# Patient Record
Sex: FEMALE | Race: WHITE | NOT HISPANIC OR LATINO | Employment: FULL TIME | URBAN - METROPOLITAN AREA
[De-identification: names, ages, dates, MRNs, and addresses within clinical notes are randomized per-mention and may not be internally consistent; named-entity substitution may affect disease eponyms.]

---

## 2023-04-11 ENCOUNTER — OFFICE VISIT (OUTPATIENT)
Dept: PRIMARY CARE | Facility: CLINIC | Age: 58
End: 2023-04-11
Payer: COMMERCIAL

## 2023-04-11 VITALS
HEIGHT: 64 IN | WEIGHT: 140 LBS | SYSTOLIC BLOOD PRESSURE: 100 MMHG | OXYGEN SATURATION: 97 % | BODY MASS INDEX: 23.9 KG/M2 | TEMPERATURE: 97.7 F | DIASTOLIC BLOOD PRESSURE: 60 MMHG | RESPIRATION RATE: 14 BRPM | HEART RATE: 72 BPM

## 2023-04-11 DIAGNOSIS — N63.21 MASS OF UPPER OUTER QUADRANT OF LEFT BREAST: Primary | ICD-10-CM

## 2023-04-11 PROBLEM — S16.1XXA NECK STRAIN, INITIAL ENCOUNTER: Status: ACTIVE | Noted: 2023-04-11

## 2023-04-11 PROBLEM — L65.9 HAIR LOSS: Status: ACTIVE | Noted: 2023-04-11

## 2023-04-11 PROBLEM — N60.81 FLAT EPITHELIAL ATYPIA OF RIGHT BREAST: Status: ACTIVE | Noted: 2023-04-11

## 2023-04-11 PROBLEM — R92.2 DENSE BREASTS: Status: ACTIVE | Noted: 2023-04-11

## 2023-04-11 PROBLEM — R92.8 ABNORMAL MRI, BREAST: Status: ACTIVE | Noted: 2023-04-11

## 2023-04-11 PROBLEM — S76.011S: Status: ACTIVE | Noted: 2023-04-11

## 2023-04-11 PROBLEM — R92.30 DENSE BREASTS: Status: ACTIVE | Noted: 2023-04-11

## 2023-04-11 PROBLEM — K63.3 EROSION OF TERMINAL ILEUM: Status: ACTIVE | Noted: 2023-04-11

## 2023-04-11 PROBLEM — F41.9 ANXIETY DISORDER: Status: ACTIVE | Noted: 2023-04-11

## 2023-04-11 PROBLEM — M54.16 LUMBAR RADICULOPATHY: Status: ACTIVE | Noted: 2023-04-11

## 2023-04-11 PROBLEM — N95.2 VAGINAL ATROPHY: Status: ACTIVE | Noted: 2023-04-11

## 2023-04-11 PROBLEM — H02.89 STEATOBLEPHARON: Status: ACTIVE | Noted: 2023-04-11

## 2023-04-11 PROBLEM — E78.5 HLD (HYPERLIPIDEMIA): Status: ACTIVE | Noted: 2023-04-11

## 2023-04-11 PROBLEM — K58.1 IRRITABLE BOWEL SYNDROME WITH PREDOMINANT CONSTIPATION: Status: ACTIVE | Noted: 2023-04-11

## 2023-04-11 PROBLEM — H02.831 DERMATOCHALASIS OF RIGHT UPPER EYELID: Status: ACTIVE | Noted: 2023-04-11

## 2023-04-11 PROBLEM — E03.9 HYPOTHYROID: Status: ACTIVE | Noted: 2023-04-11

## 2023-04-11 PROBLEM — S46.911S SHOULDER STRAIN, RIGHT, SEQUELA: Status: ACTIVE | Noted: 2023-04-11

## 2023-04-11 PROBLEM — M25.551 HIP PAIN, RIGHT: Status: ACTIVE | Noted: 2023-04-11

## 2023-04-11 PROBLEM — M79.9 POSTURAL STRAIN: Status: ACTIVE | Noted: 2023-04-11

## 2023-04-11 PROBLEM — N63.20 LEFT BREAST MASS: Status: ACTIVE | Noted: 2023-04-11

## 2023-04-11 PROBLEM — M53.3 SACRAL BACK PAIN: Status: ACTIVE | Noted: 2023-04-11

## 2023-04-11 PROBLEM — H81.10 BPPV (BENIGN PAROXYSMAL POSITIONAL VERTIGO): Status: ACTIVE | Noted: 2023-04-11

## 2023-04-11 PROBLEM — M99.09 SEGMENTAL AND SOMATIC DYSFUNCTION: Status: ACTIVE | Noted: 2023-04-11

## 2023-04-11 PROBLEM — E55.9 VITAMIN D DEFICIENCY: Status: ACTIVE | Noted: 2023-04-11

## 2023-04-11 PROBLEM — K59.00 CONSTIPATION: Status: ACTIVE | Noted: 2023-04-11

## 2023-04-11 PROBLEM — N95.1 MENOPAUSAL SYMPTOM: Status: ACTIVE | Noted: 2023-04-11

## 2023-04-11 PROBLEM — R92.8 ABNORMAL MAMMOGRAM: Status: ACTIVE | Noted: 2023-04-11

## 2023-04-11 PROBLEM — U07.1 COVID-19: Status: ACTIVE | Noted: 2023-04-11

## 2023-04-11 PROBLEM — H02.834 DERMATOCHALASIS OF LEFT UPPER EYELID: Status: ACTIVE | Noted: 2023-04-11

## 2023-04-11 PROCEDURE — 1036F TOBACCO NON-USER: CPT | Performed by: INTERNAL MEDICINE

## 2023-04-11 PROCEDURE — 99214 OFFICE O/P EST MOD 30 MIN: CPT | Performed by: INTERNAL MEDICINE

## 2023-04-11 RX ORDER — AMMONIUM LACTATE 12 G/100G
CREAM TOPICAL
COMMUNITY
Start: 2023-03-28 | End: 2023-07-27 | Stop reason: ALTCHOICE

## 2023-04-11 RX ORDER — ESTRADIOL 2 MG/1
2 SYSTEM VAGINAL
COMMUNITY
Start: 2023-03-08 | End: 2023-07-27 | Stop reason: ALTCHOICE

## 2023-04-11 NOTE — PROGRESS NOTES
"aSubjective    Kaylie Doty is a 58 y.o. female who presents for Breast Mass and Breast Pain.  HPI    Noticed a breast lump last week. Breasts feel heavy.   Was on estring ring for a few months, took it out. Thinking that was cause.   Has been on estrogren for 4 months  She had bladder sling.        Review of Systems   All other systems reviewed and are negative.        Objective     /60 (BP Location: Right arm, Patient Position: Sitting, BP Cuff Size: Adult)   Pulse 72   Temp 36.5 °C (97.7 °F)   Resp 14   Ht 1.626 m (5' 4\")   Wt 63.5 kg (140 lb)   SpO2 97%   BMI 24.03 kg/m²    Physical Exam  Vitals reviewed.   Constitutional:       General: She is not in acute distress.     Appearance: Normal appearance.   Cardiovascular:      Rate and Rhythm: Normal rate and regular rhythm.      Pulses: Normal pulses.      Heart sounds: Normal heart sounds.   Pulmonary:      Effort: Pulmonary effort is normal.      Breath sounds: Normal breath sounds.   Chest:   Breasts:     Right: Normal.      Left: Mass present.      Comments: There is tender mass likel area left breast upper outer quadrant. Around where old surgical scar is  Abdominal:      Tenderness: There is no abdominal tenderness.   Musculoskeletal:         General: No swelling.   Skin:     General: Skin is warm and dry.   Neurological:      Mental Status: She is alert.       Health Maintenance Due   Topic Date Due    Yearly Adult Physical  Never done    Hepatitis B Vaccines (1 of 3 - 3-dose series) Never done    HIV Screening  Never done    MMR Vaccines (1 of 1 - Standard series) Never done    Cervical Cancer Screening  Never done    DTaP/Tdap/Td Vaccines (1 - Tdap) Never done    TSH Level  12/28/2019    Zoster Vaccines (2 of 2) 04/07/2020    COVID-19 Vaccine (4 - Booster for Moderna series) 01/08/2022          Assessment/Plan   Problem List Items Addressed This Visit          Other    Left breast mass - Primary    Relevant Orders    Referral to Breast Surgery "    BI mammo left diagnostic   She has stopped the estrogen   Check mamm and refer to surgeon

## 2023-04-12 ENCOUNTER — TELEPHONE (OUTPATIENT)
Dept: PRIMARY CARE | Facility: CLINIC | Age: 58
End: 2023-04-12

## 2023-07-27 ENCOUNTER — OFFICE VISIT (OUTPATIENT)
Dept: PRIMARY CARE | Facility: CLINIC | Age: 58
End: 2023-07-27
Payer: COMMERCIAL

## 2023-07-27 VITALS
HEART RATE: 70 BPM | OXYGEN SATURATION: 98 % | TEMPERATURE: 98.2 F | WEIGHT: 132 LBS | HEIGHT: 64 IN | SYSTOLIC BLOOD PRESSURE: 110 MMHG | RESPIRATION RATE: 14 BRPM | DIASTOLIC BLOOD PRESSURE: 70 MMHG | BODY MASS INDEX: 22.53 KG/M2

## 2023-07-27 DIAGNOSIS — E03.9 HYPOTHYROIDISM, UNSPECIFIED TYPE: ICD-10-CM

## 2023-07-27 DIAGNOSIS — Z00.00 WELLNESS EXAMINATION: Primary | ICD-10-CM

## 2023-07-27 PROBLEM — H02.831 DERMATOCHALASIS OF RIGHT UPPER EYELID: Status: RESOLVED | Noted: 2023-04-11 | Resolved: 2023-07-27

## 2023-07-27 PROBLEM — E78.5 HLD (HYPERLIPIDEMIA): Status: RESOLVED | Noted: 2023-04-11 | Resolved: 2023-07-27

## 2023-07-27 PROBLEM — H02.89 STEATOBLEPHARON: Status: RESOLVED | Noted: 2023-04-11 | Resolved: 2023-07-27

## 2023-07-27 PROBLEM — H02.834 DERMATOCHALASIS OF LEFT UPPER EYELID: Status: RESOLVED | Noted: 2023-04-11 | Resolved: 2023-07-27

## 2023-07-27 PROCEDURE — 99396 PREV VISIT EST AGE 40-64: CPT | Performed by: INTERNAL MEDICINE

## 2023-07-27 PROCEDURE — 1036F TOBACCO NON-USER: CPT | Performed by: INTERNAL MEDICINE

## 2023-07-27 RX ORDER — MECLIZINE HYDROCHLORIDE 25 MG/1
25 TABLET ORAL 3 TIMES DAILY PRN
COMMUNITY

## 2023-07-27 NOTE — PROGRESS NOTES
"Subjective    Kaylie Doty is a 58 y.o. female who presents for Annual Exam.  HPI    No pap  C/o hot flashes   She is going to [place] she quit at     Colonoscopy 2022 repeat in 3-5  Mammogram 12/2022    Review of Systems   All other systems reviewed and are negative.        Objective     /70 (BP Location: Left arm, Patient Position: Sitting, BP Cuff Size: Adult)   Pulse 70   Temp 36.8 °C (98.2 °F) (Skin)   Resp 14   Ht 1.626 m (5' 4\")   Wt 59.9 kg (132 lb)   SpO2 98%   BMI 22.66 kg/m²    Physical Exam  Vitals reviewed.   Constitutional:       General: She is not in acute distress.     Appearance: Normal appearance.   Cardiovascular:      Rate and Rhythm: Normal rate and regular rhythm.      Pulses: Normal pulses.      Heart sounds: Normal heart sounds.   Pulmonary:      Effort: Pulmonary effort is normal.      Breath sounds: Normal breath sounds.   Chest:      Chest wall: No mass or tenderness.   Breasts:     Right: Normal.      Left: Normal.   Abdominal:      General: Abdomen is flat.      Tenderness: There is no abdominal tenderness.   Musculoskeletal:         General: No swelling.      Cervical back: Neck supple. No tenderness.   Lymphadenopathy:      Cervical: No cervical adenopathy.      Upper Body:      Right upper body: No supraclavicular or axillary adenopathy.      Left upper body: No supraclavicular or axillary adenopathy.   Skin:     General: Skin is warm and dry.   Neurological:      Mental Status: She is alert.   Psychiatric:         Attention and Perception: Attention and perception normal.         Mood and Affect: Mood and affect normal.       Health Maintenance Due   Topic Date Due    Yearly Adult Physical  Never done    Hepatitis B Vaccines (1 of 3 - 3-dose series) Never done    HIV Screening  Never done    MMR Vaccines (1 of 1 - Standard series) Never done    Cervical Cancer Screening  Never done    DTaP/Tdap/Td Vaccines (1 - Tdap) Never done    Zoster Vaccines (2 of 2) 04/07/2020    " COVID-19 Vaccine (4 - Booster for Moderna series) 01/08/2022          Assessment/Plan   Problem List Items Addressed This Visit    None  Visit Diagnoses       Wellness examination    -  Primary    Relevant Orders    CBC    Comprehensive Metabolic Panel    Lipid Panel    Hypothyroidism, unspecified type        Relevant Orders    T3, Reverse        Check labs.   Mamm in December

## 2023-07-28 ENCOUNTER — LAB (OUTPATIENT)
Dept: LAB | Facility: LAB | Age: 58
End: 2023-07-28
Payer: COMMERCIAL

## 2023-07-28 DIAGNOSIS — E03.9 HYPOTHYROIDISM, UNSPECIFIED TYPE: ICD-10-CM

## 2023-07-28 DIAGNOSIS — Z00.00 WELLNESS EXAMINATION: ICD-10-CM

## 2023-07-28 LAB
ALANINE AMINOTRANSFERASE (SGPT) (U/L) IN SER/PLAS: 18 U/L (ref 7–45)
ALBUMIN (G/DL) IN SER/PLAS: 4.3 G/DL (ref 3.4–5)
ALKALINE PHOSPHATASE (U/L) IN SER/PLAS: 47 U/L (ref 33–110)
ANION GAP IN SER/PLAS: 9 MMOL/L (ref 10–20)
ASPARTATE AMINOTRANSFERASE (SGOT) (U/L) IN SER/PLAS: 18 U/L (ref 9–39)
BILIRUBIN TOTAL (MG/DL) IN SER/PLAS: 0.5 MG/DL (ref 0–1.2)
CALCIUM (MG/DL) IN SER/PLAS: 9.2 MG/DL (ref 8.6–10.3)
CARBON DIOXIDE, TOTAL (MMOL/L) IN SER/PLAS: 31 MMOL/L (ref 21–32)
CHLORIDE (MMOL/L) IN SER/PLAS: 104 MMOL/L (ref 98–107)
CHOLESTEROL (MG/DL) IN SER/PLAS: 214 MG/DL (ref 0–199)
CHOLESTEROL IN HDL (MG/DL) IN SER/PLAS: 75.1 MG/DL
CHOLESTEROL/HDL RATIO: 2.8
CREATININE (MG/DL) IN SER/PLAS: 0.81 MG/DL (ref 0.5–1.05)
ERYTHROCYTE DISTRIBUTION WIDTH (RATIO) BY AUTOMATED COUNT: 12 % (ref 11.5–14.5)
ERYTHROCYTE MEAN CORPUSCULAR HEMOGLOBIN CONCENTRATION (G/DL) BY AUTOMATED: 32.1 G/DL (ref 32–36)
ERYTHROCYTE MEAN CORPUSCULAR VOLUME (FL) BY AUTOMATED COUNT: 93 FL (ref 80–100)
ERYTHROCYTES (10*6/UL) IN BLOOD BY AUTOMATED COUNT: 4.07 X10E12/L (ref 4–5.2)
GFR FEMALE: 85 ML/MIN/1.73M2
GLUCOSE (MG/DL) IN SER/PLAS: 82 MG/DL (ref 74–99)
HEMATOCRIT (%) IN BLOOD BY AUTOMATED COUNT: 37.7 % (ref 36–46)
HEMOGLOBIN (G/DL) IN BLOOD: 12.1 G/DL (ref 12–16)
LDL: 129 MG/DL (ref 0–99)
LEUKOCYTES (10*3/UL) IN BLOOD BY AUTOMATED COUNT: 5.7 X10E9/L (ref 4.4–11.3)
PLATELETS (10*3/UL) IN BLOOD AUTOMATED COUNT: 270 X10E9/L (ref 150–450)
POTASSIUM (MMOL/L) IN SER/PLAS: 4.1 MMOL/L (ref 3.5–5.3)
PROTEIN TOTAL: 6.6 G/DL (ref 6.4–8.2)
SODIUM (MMOL/L) IN SER/PLAS: 140 MMOL/L (ref 136–145)
TRIGLYCERIDE (MG/DL) IN SER/PLAS: 52 MG/DL (ref 0–149)
UREA NITROGEN (MG/DL) IN SER/PLAS: 13 MG/DL (ref 6–23)
VLDL: 10 MG/DL (ref 0–40)

## 2023-07-28 PROCEDURE — 80053 COMPREHEN METABOLIC PANEL: CPT

## 2023-07-28 PROCEDURE — 36415 COLL VENOUS BLD VENIPUNCTURE: CPT

## 2023-07-28 PROCEDURE — 84482 T3 REVERSE: CPT

## 2023-07-28 PROCEDURE — 80061 LIPID PANEL: CPT

## 2023-07-28 PROCEDURE — 85027 COMPLETE CBC AUTOMATED: CPT

## 2023-08-02 LAB — T3 REVERSE: 9.6 NG/DL (ref 9–27)

## 2023-12-11 ENCOUNTER — OFFICE VISIT (OUTPATIENT)
Dept: OBSTETRICS AND GYNECOLOGY | Facility: CLINIC | Age: 58
End: 2023-12-11
Payer: COMMERCIAL

## 2023-12-11 VITALS
BODY MASS INDEX: 22.53 KG/M2 | SYSTOLIC BLOOD PRESSURE: 110 MMHG | DIASTOLIC BLOOD PRESSURE: 68 MMHG | WEIGHT: 132 LBS | HEIGHT: 64 IN

## 2023-12-11 DIAGNOSIS — N95.2 VAGINAL ATROPHY: Primary | ICD-10-CM

## 2023-12-11 PROCEDURE — 99214 OFFICE O/P EST MOD 30 MIN: CPT | Performed by: STUDENT IN AN ORGANIZED HEALTH CARE EDUCATION/TRAINING PROGRAM

## 2023-12-11 PROCEDURE — 1036F TOBACCO NON-USER: CPT | Performed by: STUDENT IN AN ORGANIZED HEALTH CARE EDUCATION/TRAINING PROGRAM

## 2023-12-11 RX ORDER — ESTRADIOL 0.1 MG/G
CREAM VAGINAL
Qty: 42.5 G | Refills: 2 | Status: SHIPPED | OUTPATIENT
Start: 2023-12-11 | End: 2024-05-24 | Stop reason: HOSPADM

## 2023-12-11 ASSESSMENT — PAIN SCALES - GENERAL: PAINLEVEL: 0-NO PAIN

## 2023-12-11 NOTE — PROGRESS NOTES
HISTORY OF PRESENT ILLNESS:  Kaylie Doty is a 58 y.o. female, who presents in follow up for post op (SCP, TVT with Dr. Manzanares )     During last encounter on 22, reviewed and agreed to the followin. post op  - patient is doing well and has no signs of prolapse of bladder issues      2. Vaginal atrophy  - discussed E-ring for vaginal dryness. patient is interested in this option if it is covered. Planning to use vaginal estrogen tablets as a secondary plan of action if estring is not covered    Today she reports   Used e-string for 6 months, subsequently developed breast lump so got nervous and stopped. Had some dryness but is now using vaginal moisturizer.    The following were reviewed to gain additional history:  External notes: Dr. Beebe health maintenance visit 23  Test results: Cr 0.81 (23)          PHYSICAL EXAMINATION:  No LMP recorded.  There is no height or weight on file to calculate BMI.  There were no vitals taken for this visit.    General Appearance: well appearing  Neuro: Alert and oriented   HEENT: mucous membranes moist, neck supple  Resp: No respiratory distress, normal work of breathing  MSK: normal range of motion, gait appropriate    Pelvic:  Chaperone for pelvic exam:   Genitourinary:  normal external genitalia, Bartholin's glands, [place]'s glands negative,   Urethra normal meatus, non-tender, no periurethral mass  Vaginal mucosa  normal, no evidence of mesh exposure  Cervix absent  Uterus absent  Adnexae  negative nontender, no masses  Atrophy positive    CST negative      POP-Q (in supine position):  No significant prolapse    Rectal: no hemorrhoids, fissures or masses.    PVR (by ultrasound): n/a  Urine dip:  No results found for this or any previous visit.       IMPRESSION AND PLAN:  Kaylie Doty is a 58 y.o. who presents in follow up for post op (SCP, TVT with Dr. Manzanares ).    Post op  - doing well  - no e/o mesh exposure    Vaginal atrophy  - present on  exam  - discussed routes of estrogen, amenable to estrogen cream  - rx sent today to preferred pharmacy, advised on instructions for use    All questions and concerns were answered and addressed.  The patient expressed understanding and agrees with the plan.     Return for annual exam    Patti Kearns MD

## 2023-12-18 ENCOUNTER — HOSPITAL ENCOUNTER (OUTPATIENT)
Dept: RADIOLOGY | Facility: HOSPITAL | Age: 58
Discharge: HOME | End: 2023-12-18
Payer: COMMERCIAL

## 2023-12-18 DIAGNOSIS — Z12.31 SCREENING MAMMOGRAM FOR BREAST CANCER: ICD-10-CM

## 2023-12-18 PROCEDURE — 77063 BREAST TOMOSYNTHESIS BI: CPT

## 2023-12-18 PROCEDURE — 77067 SCR MAMMO BI INCL CAD: CPT | Performed by: RADIOLOGY

## 2023-12-18 PROCEDURE — 77063 BREAST TOMOSYNTHESIS BI: CPT | Performed by: RADIOLOGY

## 2023-12-19 ASSESSMENT — ENCOUNTER SYMPTOMS
DYSURIA: 0
DIFFICULTY URINATING: 0
SHORTNESS OF BREATH: 0
DIZZINESS: 0
VOMITING: 0
FEVER: 0
COLOR CHANGE: 0
AGITATION: 0
NAUSEA: 0
DIARRHEA: 0

## 2023-12-19 NOTE — PROGRESS NOTES
Assessment/Plan   Patient's left gluteal pain is most consistent with greater trochanteric pain. Previously had radicular symptoms, potentially in relation to previously identified disc herniation at L5/S1 yet no longer has features of this condition. Fortunately there are no neurologic deficits and her strength is intact. Advised the patient to stay active. Treatment will involve spinal manipulation soft tissue manipulation and dry needling of the lumbar gluteal muscles.    Visits this year: 6    Subjective   Patient reports that she was doing well after her last series of visit but symptoms gradually returned over the past few weeks without specific injury.  Endorses moderate intensity pain chiefly affecting the left gluteal region and lumbosacral junction.  Has been exercising regularly doing yoga.  No radiating pain distal to the gluteal region and denies any recent trauma    HPI - LBP w/ radiation (R) 06/23/2023 -patient reports 5-day history of low back pain worse on the right side with intermittent radiation to the right lateral thigh. Pain was initially severe but has subsided to a moderate level. Notes that her low back was doing relatively well prior to this without any significant symptoms. Previously was treated in 2022 for lumbar disc herniation at L5/S1 notes that she recovered a lot and was doing exercise walking regularly lifting weights. Notes that symptoms flared after driving 2 and half hours to a concert and then driving home afterwards had a lot of tightness and pain in the lower back and began to radiate. Also notes that she was standing in shoes that had a lifted heel which may have triggered the back pain as well. No numbness or tingling distal to the knee or weakness in the lower extremities. 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 Fall on her tailbone when she was 19 years old. hysterectomy and  bladder sling. Vein ligation of the L thigh. She denies any trauma, incidents, or accidents to the area other than a fall at 19 years old. She has three daughters. Her youngest is 20 years old.     Review of Systems   Constitutional:  Negative for fever.   Eyes:  Negative for visual disturbance.   Respiratory:  Negative for shortness of breath.    Cardiovascular:  Negative for chest pain.   Gastrointestinal:  Negative for diarrhea, nausea and vomiting.   Genitourinary:  Negative for difficulty urinating and dysuria.   Skin:  Negative for color change.   Neurological:  Negative for dizziness.   Psychiatric/Behavioral:  Negative for agitation.    All other systems reviewed and are negative.    Objective   Examination findings (e.g., palpation & ROM): mild dec LS ROM. HTN/tender L LS erectors & g med  SLR 70 BL  Mild pain R FAIR     Segmental joint dysfunction was identified in the following areas using motion palpation and/or pain provocation assessment:  Cervical:   Thoracic: 8, 10, 12  Lumbopelvic:  1-2, BL SIJ    Physical Exam  Neurological:      Mental Status: She is alert.      Sensory: Sensation is intact.      Motor: Motor function is intact.      Coordination: Coordination is intact.      Gait: Gait is intact.      Deep Tendon Reflexes:      Reflex Scores:       Patellar reflexes are 2+ on the right side and 2+ on the left side.       Achilles reflexes are 2+ on the right side and 2+ on the left side.     Comments: ENMANUEL ANDRES 12/20/23         Plan   Today's treatment:  Dry needling (10 in, 10 out) to region of the chief complaint / hypertonic muscles identified upon palpation in L g med  SMT to regions of segmental dysfunction identified on exam, using age-appropriate force, and manual diversified technique.   Manual dynamic stretching of the gluteal muscles, hamstrings BL  4:22 to 4:38 PM  Patient noted reduced pain and improved mobility post-treatment    Treatment Plan:   The patient and I discussed the risks  and benefits of chiropractic care. Based on the patient's subjective complaints along with the examination findings, it is advised that a course of chiropractic treatment by initiated. The patient provided consent for care. The patient tolerated today's treatment with little or no additional discomfort and was instructed to contact the office for questions or concerns. Will see patient once per week then every 2 weeks when symptoms become mild/manageable, further spaced apart contingent upon improvement.     This chart note was generated using dictation software, and as such, there may be typographical errors present. Abbreviations: Cervical spine (CS), cervical-thoracic (CT), Dry needling (DN), Flexion adduction internal rotation (FAIR), high velocity, low amplitude (HVLA), Lumbar spine (LS), Soft tissue manipulation (STM), spinal manipulative therapy (SMT), Straight leg raise (SLR), Thoracic spine (TS).

## 2023-12-20 ENCOUNTER — ALLIED HEALTH (OUTPATIENT)
Dept: INTEGRATIVE MEDICINE | Facility: CLINIC | Age: 58
End: 2023-12-20
Payer: COMMERCIAL

## 2023-12-20 DIAGNOSIS — M99.03 SOMATIC DYSFUNCTION OF LUMBAR REGION: ICD-10-CM

## 2023-12-20 DIAGNOSIS — M99.02 SOMATIC DYSFUNCTION OF THORACIC REGION: Primary | ICD-10-CM

## 2023-12-20 DIAGNOSIS — M25.559 GREATER TROCHANTERIC PAIN SYNDROME: ICD-10-CM

## 2023-12-20 DIAGNOSIS — M99.05 SOMATIC DYSFUNCTION OF PELVIS REGION: ICD-10-CM

## 2023-12-20 PROCEDURE — 97112 NEUROMUSCULAR REEDUCATION: CPT | Performed by: CHIROPRACTOR

## 2023-12-20 PROCEDURE — 98941 CHIROPRACT MANJ 3-4 REGIONS: CPT | Performed by: CHIROPRACTOR

## 2023-12-26 ASSESSMENT — ENCOUNTER SYMPTOMS
DIFFICULTY URINATING: 0
VOMITING: 0
NAUSEA: 0
SHORTNESS OF BREATH: 0
COLOR CHANGE: 0
DIARRHEA: 0
DYSURIA: 0
AGITATION: 0
FEVER: 0
DIZZINESS: 0

## 2023-12-26 NOTE — PROGRESS NOTES
Assessment/Plan   Patient's left gluteal pain is most consistent with greater trochanteric pain. Previously had radicular symptoms, potentially in relation to previously identified disc herniation at L5/S1 yet no longer has features of this condition. Fortunately there are no neurologic deficits and her strength is intact. Advised the patient to stay active. Treatment will involve spinal manipulation soft tissue manipulation and dry needling of the lumbar gluteal muscles.    Visits this year: 7    Subjective   Patient reports that she is feeling little better compared to last visit.  To get relief with some return of symptoms.  Currently not endorsing constant mild pain and tightness in the outer gluteal region and trochanteric region and intermittent pain in the lower back.  Has been stretching regularly as well as exercising with some relief while stretching    HPI - LBP w/ radiation (R) 06/23/2023 -patient reports 5-day history of low back pain worse on the right side with intermittent radiation to the right lateral thigh. Pain was initially severe but has subsided to a moderate level. Notes that her low back was doing relatively well prior to this without any significant symptoms. Previously was treated in 2022 for lumbar disc herniation at L5/S1 notes that she recovered a lot and was doing exercise walking regularly lifting weights. Notes that symptoms flared after driving 2 and half hours to a concert and then driving home afterwards had a lot of tightness and pain in the lower back and began to radiate. Also notes that she was standing in shoes that had a lifted heel which may have triggered the back pain as well. No numbness or tingling distal to the knee or weakness in the lower extremities. 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 Fall on her tailbone when she was 19 years old. hysterectomy and bladder sling. Vein  ligation of the L thigh. She denies any trauma, incidents, or accidents to the area other than a fall at 19 years old. She has three daughters. Her youngest is 20 years old.     Review of Systems   Constitutional:  Negative for fever.   Eyes:  Negative for visual disturbance.   Respiratory:  Negative for shortness of breath.    Cardiovascular:  Negative for chest pain.   Gastrointestinal:  Negative for diarrhea, nausea and vomiting.   Genitourinary:  Negative for difficulty urinating and dysuria.   Skin:  Negative for color change.   Neurological:  Negative for dizziness.   Psychiatric/Behavioral:  Negative for agitation.    All other systems reviewed and are negative.    Objective   Examination findings (e.g., palpation & ROM): mild dec LS ROM. HTN/tender L LS erectors & BL g med  SLR 70 BL  Mild pain R FAIR     Segmental joint dysfunction was identified in the following areas using motion palpation and/or pain provocation assessment:  Cervical:   Thoracic: 8, 10, 12  Lumbopelvic:  1-2, BL SIJ    Physical Exam  Neurological:      Mental Status: She is alert.      Sensory: Sensation is intact.      Motor: Motor function is intact.      Coordination: Coordination is intact.      Gait: Gait is intact.      Deep Tendon Reflexes:      Reflex Scores:       Patellar reflexes are 2+ on the right side and 2+ on the left side.       Achilles reflexes are 2+ on the right side and 2+ on the left side.     Comments: ENMANUEL ANDRES 12/20/23         Plan   Today's treatment:  Dry needling (10 in, 10 out) to region of the chief complaint / hypertonic muscles identified upon palpation in BL g med  SMT to regions of segmental dysfunction identified on exam, using age-appropriate force, and manual diversified technique.   Manual dynamic stretching of the gluteal muscles, hamstrings BL  4:20 to 4:37 PM  Patient noted reduced pain and improved mobility post-treatment    Treatment Plan:   The patient and I discussed the risks and benefits of  chiropractic care. Based on the patient's subjective complaints along with the examination findings, it is advised that a course of chiropractic treatment by initiated. The patient provided consent for care. The patient tolerated today's treatment with little or no additional discomfort and was instructed to contact the office for questions or concerns. Will see patient once per week then every 2 weeks when symptoms become mild/manageable, further spaced apart contingent upon improvement.     This chart note was generated using dictation software, and as such, there may be typographical errors present. Abbreviations: Cervical spine (CS), cervical-thoracic (CT), Dry needling (DN), Flexion adduction internal rotation (FAIR), high velocity, low amplitude (HVLA), Lumbar spine (LS), Soft tissue manipulation (STM), spinal manipulative therapy (SMT), Straight leg raise (SLR), Thoracic spine (TS).

## 2023-12-27 ENCOUNTER — ALLIED HEALTH (OUTPATIENT)
Dept: INTEGRATIVE MEDICINE | Facility: CLINIC | Age: 58
End: 2023-12-27
Payer: COMMERCIAL

## 2023-12-27 DIAGNOSIS — M99.03 SOMATIC DYSFUNCTION OF LUMBAR REGION: ICD-10-CM

## 2023-12-27 DIAGNOSIS — M25.559 GREATER TROCHANTERIC PAIN SYNDROME: ICD-10-CM

## 2023-12-27 DIAGNOSIS — M99.02 SOMATIC DYSFUNCTION OF THORACIC REGION: Primary | ICD-10-CM

## 2023-12-27 DIAGNOSIS — M99.05 SOMATIC DYSFUNCTION OF PELVIS REGION: ICD-10-CM

## 2023-12-27 PROCEDURE — 98941 CHIROPRACT MANJ 3-4 REGIONS: CPT | Performed by: CHIROPRACTOR

## 2023-12-27 PROCEDURE — 97140 MANUAL THERAPY 1/> REGIONS: CPT | Performed by: CHIROPRACTOR

## 2024-01-16 ASSESSMENT — ENCOUNTER SYMPTOMS
DYSURIA: 0
VOMITING: 0
NAUSEA: 0
COLOR CHANGE: 0
DIARRHEA: 0
AGITATION: 0
SHORTNESS OF BREATH: 0
DIZZINESS: 0
DIFFICULTY URINATING: 0
FEVER: 0

## 2024-01-16 NOTE — PROGRESS NOTES
Assessment/Plan   Patient's left gluteal pain is most consistent with greater trochanteric pain. Previously had radicular symptoms, potentially in relation to previously identified disc herniation at L5/S1 yet no longer has features of this condition. Fortunately there are no neurologic deficits and her strength is intact. Advised the patient to stay active. Treatment will involve spinal manipulation soft tissue manipulation and dry needling of the lumbar gluteal muscles. No dedicated hip radiographs but we could see some of the hip joints on 2022 LS radiograph and no apparent significant OA noted. Recommended she look into low FODMAP diet for IBS-C.    Visits this year: 1    Subjective   Patient reports that she had some relief after last visit but symptoms gradually returned she is noting mild to moderate intermittent left gluteal pain and trochanteric pain with occasional left groin pain.  Wonders if maybe something is going on with her hip joint or she has some constipation may be wondering if that is contributing to this as well.  Was trying some medications like Linzess but noted that this gave her diarrhea so she stopped.  Also saw naturopath but did not like the advice so she was not taking the supplements that the naturopath recommended for constipation out of concern. Localized symptoms without radiation into distal extremity.    HPI - LBP w/ radiation (R) 06/23/2023 -patient reports 5-day history of low back pain worse on the right side with intermittent radiation to the right lateral thigh. Pain was initially severe but has subsided to a moderate level. Notes that her low back was doing relatively well prior to this without any significant symptoms. Previously was treated in 2022 for lumbar disc herniation at L5/S1 notes that she recovered a lot and was doing exercise walking regularly lifting weights. Notes that symptoms flared after driving 2 and half hours to a concert and then driving home afterwards  had a lot of tightness and pain in the lower back and began to radiate. Also notes that she was standing in shoes that had a lifted heel which may have triggered the back pain as well. No numbness or tingling distal to the knee or weakness in the lower extremities. 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 Fall on her tailbone when she was 19 years old. hysterectomy and bladder sling. Vein ligation of the L thigh. She denies any trauma, incidents, or accidents to the area other than a fall at 19 years old. She has three daughters. Her youngest is 20 years old.     Review of Systems   Constitutional:  Negative for fever.   Eyes:  Negative for visual disturbance.   Respiratory:  Negative for shortness of breath.    Cardiovascular:  Negative for chest pain.   Gastrointestinal:  Negative for diarrhea, nausea and vomiting.   Genitourinary:  Negative for difficulty urinating and dysuria.   Skin:  Negative for color change.   Neurological:  Negative for dizziness.   Psychiatric/Behavioral:  Negative for agitation.    All other systems reviewed and are negative.    Objective   Examination findings (e.g., palpation & ROM): mild dec LS ROM. HTN/tender L LS erectors & BL g med L>R  SLR 70 BL  Mild pain L FAIR   LE Achilles 2+ BL    Segmental joint dysfunction was identified in the following areas using motion palpation and/or pain provocation assessment:  Cervical:   Thoracic: 8, 10, 12  Lumbopelvic:  1-2, BL SIJ    Physical Exam  Neurological:      Mental Status: She is alert.      Sensory: Sensation is intact.      Motor: Motor function is intact.      Coordination: Coordination is intact.      Gait: Gait is intact.      Deep Tendon Reflexes:      Reflex Scores:       Patellar reflexes are 2+ on the right side and 2+ on the left side.       Achilles reflexes are 2+ on the right side and 2+ on the left side.     Comments: LE WNL  12/20/23         Plan   Today's treatment:  Dry needling (10 in, 10 out) to region of the chief complaint / hypertonic muscles identified upon palpation in BL g med  SMT to regions of segmental dysfunction identified on exam, using age-appropriate force, and manual diversified technique.   Manual dynamic stretching of the gluteal muscles, hamstrings BL  12:22 to 12:44 PM  Patient noted reduced pain and improved mobility post-treatment    Treatment Plan:   The patient and I discussed the risks and benefits of chiropractic care. Based on the patient's subjective complaints along with the examination findings, it is advised that a course of chiropractic treatment by initiated. The patient provided consent for care. The patient tolerated today's treatment with little or no additional discomfort and was instructed to contact the office for questions or concerns. Will see patient once per week then every 2 weeks when symptoms become mild/manageable, further spaced apart contingent upon improvement.     This chart note was generated using dictation software, and as such, there may be typographical errors present. Abbreviations: Cervical spine (CS), cervical-thoracic (CT), Dry needling (DN), Flexion adduction internal rotation (FAIR), high velocity, low amplitude (HVLA), Lumbar spine (LS), Soft tissue manipulation (STM), spinal manipulative therapy (SMT), Straight leg raise (SLR), Thoracic spine (TS).

## 2024-01-17 ENCOUNTER — ALLIED HEALTH (OUTPATIENT)
Dept: INTEGRATIVE MEDICINE | Facility: CLINIC | Age: 59
End: 2024-01-17
Payer: COMMERCIAL

## 2024-01-17 DIAGNOSIS — M99.05 SOMATIC DYSFUNCTION OF PELVIS REGION: ICD-10-CM

## 2024-01-17 DIAGNOSIS — M99.02 SOMATIC DYSFUNCTION OF THORACIC REGION: Primary | ICD-10-CM

## 2024-01-17 DIAGNOSIS — M25.559 GREATER TROCHANTERIC PAIN SYNDROME: ICD-10-CM

## 2024-01-17 DIAGNOSIS — M99.03 SOMATIC DYSFUNCTION OF LUMBAR REGION: ICD-10-CM

## 2024-01-17 PROCEDURE — 97140 MANUAL THERAPY 1/> REGIONS: CPT | Performed by: CHIROPRACTOR

## 2024-01-17 PROCEDURE — 98941 CHIROPRACT MANJ 3-4 REGIONS: CPT | Performed by: CHIROPRACTOR

## 2024-02-06 ASSESSMENT — ENCOUNTER SYMPTOMS
AGITATION: 0
FEVER: 0
NAUSEA: 0
COLOR CHANGE: 0
DIZZINESS: 0
SHORTNESS OF BREATH: 0
DIFFICULTY URINATING: 0
DYSURIA: 0
DIARRHEA: 0
VOMITING: 0

## 2024-02-06 NOTE — PROGRESS NOTES
Assessment/Plan   Patient's left gluteal pain is most consistent with greater trochanteric pain. Previously had radicular symptoms, potentially in relation to previously identified disc herniation at L5/S1 yet no longer has features of this condition. Fortunately there are no neurologic deficits and her strength is intact. Advised the patient to stay active. Treatment will involve spinal manipulation soft tissue manipulation and dry needling of the lumbar gluteal muscles. No dedicated hip radiographs but we could see some of the hip joints on 2022 LS radiograph and no apparent significant OA noted. Recommended she look into low FODMAP diet for IBS-C.    Visits this year: 2    Subjective   Patient reports that she is doing a little bit better compared to last visit with slightly less pain and tightness in the trochanteric region and gluteal region however it still moderate and intermittent in intensity.  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  No radiating pain further distal to the gluteal region.  Also endorses mild tightness in the lower back.  Mild pain that comes and goes in the right medial elbow as well probably related to using the computer as well.  Has been very active aside from this and is taking care of her health in general.  Adhering to a low FODMAP diet most of the time which she feels has helped her gastrointestinal symptoms and constipation but is following up with gastroenterologist    HPI - LBP w/ radiation (R) 06/23/2023 -patient reports 5-day history of low back pain worse on the right side with intermittent radiation to the right lateral thigh. Pain was initially severe but has subsided to a moderate level. Notes that her low back was doing relatively well prior to this without any significant symptoms. Previously was treated in 2022 for lumbar disc herniation at L5/S1 notes that she recovered a lot and was  doing exercise walking regularly lifting weights. Notes that symptoms flared after driving 2 and half hours to a concert and then driving home afterwards had a lot of tightness and pain in the lower back and began to radiate. Also notes that she was standing in shoes that had a lifted heel which may have triggered the back pain as well. No numbness or tingling distal to the knee or weakness in the lower extremities. 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 Fall on her tailbone when she was 19 years old. hysterectomy and bladder sling. Vein ligation of the L thigh. She denies any trauma, incidents, or accidents to the area other than a fall at 19 years old. She has three daughters. Her youngest is 20 years old.     Review of Systems   Constitutional:  Negative for fever.   Eyes:  Negative for visual disturbance.   Respiratory:  Negative for shortness of breath.    Cardiovascular:  Negative for chest pain.   Gastrointestinal:  Negative for diarrhea, nausea and vomiting.   Genitourinary:  Negative for difficulty urinating and dysuria.   Skin:  Negative for color change.   Neurological:  Negative for dizziness.   Psychiatric/Behavioral:  Negative for agitation.    All other systems reviewed and are negative.    Objective   Examination findings (e.g., palpation & ROM): mild dec LS ROM. HTN/tender L LS erectors & BL g med L>R and L TFL  SLR 70 BL  Mild pain L FAIR   LE Achilles 2+ BL    Segmental joint dysfunction was identified in the following areas using motion palpation and/or pain provocation assessment:  Cervical:   Thoracic: 8, 11  Lumbopelvic:  1-2, BL SIJ    Physical Exam  Neurological:      Mental Status: She is alert.      Sensory: Sensation is intact.      Motor: Motor function is intact.      Coordination: Coordination is intact.      Gait: Gait is intact.      Deep Tendon Reflexes:      Reflex Scores:       Patellar  reflexes are 2+ on the right side and 2+ on the left side.       Achilles reflexes are 2+ on the right side and 2+ on the left side.     Comments: ENMANUEL JOSEPHL 12/20/23         Plan   Today's treatment:  Dry needling (10 in, 10 out) to region of the chief complaint / hypertonic muscles identified upon palpation in L g med & TFL and STM of these muscles  SMT to regions of segmental dysfunction identified on exam, using age-appropriate force, and manual diversified technique.   Manual dynamic stretching of the gluteal muscles, hamstrings BL  12:21 to 12:36 PM  Patient noted reduced pain and improved mobility post-treatment    Treatment Plan:   The patient and I discussed the risks and benefits of chiropractic care. Based on the patient's subjective complaints along with the examination findings, it is advised that a course of chiropractic treatment by initiated. The patient provided consent for care. The patient tolerated today's treatment with little or no additional discomfort and was instructed to contact the office for questions or concerns. Will see patient once per week then every 2 weeks when symptoms become mild/manageable, further spaced apart contingent upon improvement.     This chart note was generated using dictation software, and as such, there may be typographical errors present. Abbreviations: Cervical spine (CS), cervical-thoracic (CT), Dry needling (DN), Flexion adduction internal rotation (FAIR), high velocity, low amplitude (HVLA), Lumbar spine (LS), Soft tissue manipulation (STM), spinal manipulative therapy (SMT), Straight leg raise (SLR), Thoracic spine (TS).

## 2024-02-07 ENCOUNTER — ALLIED HEALTH (OUTPATIENT)
Dept: INTEGRATIVE MEDICINE | Facility: CLINIC | Age: 59
End: 2024-02-07
Payer: COMMERCIAL

## 2024-02-07 DIAGNOSIS — M99.02 SOMATIC DYSFUNCTION OF THORACIC REGION: Primary | ICD-10-CM

## 2024-02-07 DIAGNOSIS — M99.03 SOMATIC DYSFUNCTION OF LUMBAR REGION: ICD-10-CM

## 2024-02-07 DIAGNOSIS — M25.559 GREATER TROCHANTERIC PAIN SYNDROME: ICD-10-CM

## 2024-02-07 DIAGNOSIS — M99.05 SOMATIC DYSFUNCTION OF PELVIS REGION: ICD-10-CM

## 2024-02-07 PROCEDURE — 98941 CHIROPRACT MANJ 3-4 REGIONS: CPT | Performed by: CHIROPRACTOR

## 2024-02-07 PROCEDURE — 97140 MANUAL THERAPY 1/> REGIONS: CPT | Performed by: CHIROPRACTOR

## 2024-02-21 ENCOUNTER — OFFICE VISIT (OUTPATIENT)
Dept: GASTROENTEROLOGY | Facility: CLINIC | Age: 59
End: 2024-02-21
Payer: COMMERCIAL

## 2024-02-21 VITALS
HEART RATE: 76 BPM | WEIGHT: 139 LBS | TEMPERATURE: 97.3 F | DIASTOLIC BLOOD PRESSURE: 82 MMHG | BODY MASS INDEX: 23.86 KG/M2 | SYSTOLIC BLOOD PRESSURE: 126 MMHG

## 2024-02-21 DIAGNOSIS — R10.9 LEFT SIDED ABDOMINAL PAIN: Primary | ICD-10-CM

## 2024-02-21 DIAGNOSIS — K59.09 CHRONIC CONSTIPATION: ICD-10-CM

## 2024-02-21 PROCEDURE — 1036F TOBACCO NON-USER: CPT | Performed by: INTERNAL MEDICINE

## 2024-02-21 PROCEDURE — 99213 OFFICE O/P EST LOW 20 MIN: CPT | Performed by: INTERNAL MEDICINE

## 2024-02-21 ASSESSMENT — ENCOUNTER SYMPTOMS
EYE PAIN: 0
ARTHRALGIAS: 0
FEVER: 0
UNEXPECTED WEIGHT CHANGE: 0
SHORTNESS OF BREATH: 0
ROS GI COMMENTS: SEE HPI
WEAKNESS: 0
CHILLS: 0

## 2024-02-21 ASSESSMENT — PAIN SCALES - GENERAL: PAINLEVEL: 2

## 2024-02-21 NOTE — PROGRESS NOTES
Subjective     History of Present Illness:   Kaylie Doty is a 59 y.o. female with chronic constipation, s/p hysterectomy, and colon polyp who presented to GI clinic for follow-up.  Patient reported having a history of chronic constipation and intermittent left-sided abdominal pain.   Colonoscopy in 2017 showed several erosions in the terminal ileum, anal skin tags, and hemorrhoids.  Pathology revealed healing erosion/ulcer in the TI and normal colon. Repeat colonoscopy in 2022 showed normal terminal ileum and 1 sessile serrated polyp.  Currently, patient reported having constant left-sided abdominal pain for the past month  Initially, she thought it was hip pain so she went to see a chiropractor but the pain persisted despite undergoing different types of exercises and manipulation.  The left-sided abdominal pain improves after a bowel movement.  Patient has been taking miralax daily and colace twice a day which gives her alternating constipation and diarrhea.  She has tried linzess previously but it causes diarrhea.  She was taking a colon cleanse mixture that contains psyllium for about 2-3 months.  During that time, she would have 2 Bms a day and feels well.      Review of Systems  Review of Systems   Constitutional:  Negative for chills, fever and unexpected weight change.   HENT:  Negative for mouth sores.    Eyes:  Negative for pain.   Respiratory:  Negative for shortness of breath.    Cardiovascular:  Negative for chest pain.   Gastrointestinal:         See HPI   Musculoskeletal:  Negative for arthralgias.   Skin:  Negative for rash.   Neurological:  Negative for weakness.       Past Medical History   has a past medical history of Abnormal uterine and vaginal bleeding, unspecified (06/03/2014), Allergic (1/1/1980), Benign paroxysmal vertigo, unspecified ear (01/16/2021), Encounter for screening mammogram for malignant neoplasm of breast, Other conditions influencing health status, Other conditions influencing  health status (06/03/2014), Other conditions influencing health status (10/01/2013), Personal history of diseases of the skin and subcutaneous tissue, Personal history of other diseases of urinary system (11/16/2015), Personal history of other specified conditions (03/12/2019), Stress incontinence (female) (male) (03/18/2019), and Unspecified injury of unspecified elbow, initial encounter.     Social History   reports that she has never smoked. She has never used smokeless tobacco. She reports that she does not currently use alcohol. She reports that she does not use drugs.     Family History  family history includes COPD in her father; Heart disease in her father; Hyperlipidemia in her father and mother; Hypertension in her father and mother; Kidney disease in her father; Osteoporosis in her mother.     Allergies  Allergies   Allergen Reactions    Morphine Other     N/V       Medications  Current Outpatient Medications   Medication Instructions    estradiol (Estrace) 0.01 % (0.1 mg/gram) vaginal cream Use a pea sized amount in the vagina twice weekly at bedtime    magnesium 30 mg, oral, 2 times daily    meclizine (ANTIVERT) 25 mg, oral, 3 times daily PRN        Objective   Visit Vitals  /82   Pulse 76   Temp 36.3 °C (97.3 °F)      Physical Exam  Constitutional:       Appearance: Normal appearance.   HENT:      Head: Normocephalic and atraumatic.   Eyes:      General: No scleral icterus.  Cardiovascular:      Rate and Rhythm: Normal rate and regular rhythm.   Pulmonary:      Effort: Pulmonary effort is normal.      Breath sounds: Normal breath sounds. No wheezing.   Abdominal:      General: Bowel sounds are normal.      Palpations: Abdomen is soft. There is no mass.      Tenderness: There is no abdominal tenderness. There is no guarding or rebound.   Musculoskeletal:         General: Normal range of motion.      Cervical back: Normal range of motion.   Skin:     Coloration: Skin is not jaundiced.    Neurological:      Mental Status: She is alert and oriented to person, place, and time.         Labs  Lab Results   Component Value Date    WBC 5.7 07/28/2023    HGB 12.1 07/28/2023    HCT 37.7 07/28/2023    MCV 93 07/28/2023     07/28/2023     Lab Results   Component Value Date    GLUCOSE 82 07/28/2023    CALCIUM 9.2 07/28/2023     07/28/2023    K 4.1 07/28/2023    CO2 31 07/28/2023     07/28/2023    BUN 13 07/28/2023    CREATININE 0.81 07/28/2023     Lab Results   Component Value Date    ALT 18 07/28/2023    AST 18 07/28/2023    ALKPHOS 47 07/28/2023    BILITOT 0.5 07/28/2023       Assessment/Plan   Kaylie Doty is a 59 y.o. female with chronic constipation, s/p hysterectomy, and colon polyp who presented to GI clinic for follow-up. She has chronic left sided abdominal pain that is relieved after having a bowel movement.  I suspect that she has IBS with constipation and has previously responded to fiber supplements.  Recommend taking psyllium 2 caps daily.  Follow-up as needed if her symptoms do not improve.    Patient Instructions  Take psyllium husk 2 caps a day.  Drink plenty of water.  Follow-up as needed.    Dong Kapadia MD

## 2024-02-22 ASSESSMENT — ENCOUNTER SYMPTOMS
FEVER: 0
DIFFICULTY URINATING: 0
AGITATION: 0
DIARRHEA: 0
VOMITING: 0
DIZZINESS: 0
DYSURIA: 0
NAUSEA: 0
COLOR CHANGE: 0
SHORTNESS OF BREATH: 0

## 2024-02-22 NOTE — PROGRESS NOTES
Assessment/Plan   Patient's left gluteal pain is most consistent with greater trochanteric pain. Previously had radicular symptoms, potentially in relation to previously identified disc herniation at L5/S1 yet no longer has features of this condition. Fortunately there are no neurologic deficits and her strength is intact. Advised the patient to stay active. Treatment will involve spinal manipulation soft tissue manipulation and dry needling of the lumbar gluteal muscles. No dedicated hip radiographs but we could see some of the hip joints on 2022 LS radiograph and no apparent significant OA noted. Recommended she look into low FODMAP diet for IBS-C.    Visits this year: 3.    Subjective   Patient reports that she is much better compared to last visit noting that she is dealing with left-sided lumbar and gluteal pain rated 2 out of 10 which is intermittent and frequent.  Feels that it is progressively improved with our treatment but also stretching and staying active and may be what also helped is getting her gastrointestinal tract doing better with new prescription from her GI physician but also doing low FODMAP diet.  Feels that the pain that improved with the GI changes is having less of the pain in the front of the hip whereas some of the pain in the back and side of the hip are still there    HPI - LBP w/ radiation (R) 06/23/2023 -patient reports 5-day history of low back pain worse on the right side with intermittent radiation to the right lateral thigh. Pain was initially severe but has subsided to a moderate level. Notes that her low back was doing relatively well prior to this without any significant symptoms. Previously was treated in 2022 for lumbar disc herniation at L5/S1 notes that she recovered a lot and was doing exercise walking regularly lifting weights. Notes that symptoms flared after driving 2 and half hours to a concert and then driving home afterwards had a lot of tightness and pain in the  lower back and began to radiate. Also notes that she was standing in shoes that had a lifted heel which may have triggered the back pain as well. No numbness or tingling distal to the knee or weakness in the lower extremities. 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 Fall on her tailbone when she was 19 years old. hysterectomy and bladder sling. Vein ligation of the L thigh. She denies any trauma, incidents, or accidents to the area other than a fall at 19 years old. She has three daughters. Her youngest is 20 years old.     Review of Systems   Constitutional:  Negative for fever.   Eyes:  Negative for visual disturbance.   Respiratory:  Negative for shortness of breath.    Cardiovascular:  Negative for chest pain.   Gastrointestinal:  Negative for diarrhea, nausea and vomiting.   Genitourinary:  Negative for difficulty urinating and dysuria.   Skin:  Negative for color change.   Neurological:  Negative for dizziness.   Psychiatric/Behavioral:  Negative for agitation.    All other systems reviewed and are negative.    Objective   Examination findings (e.g., palpation & ROM): mild dec LS ROM. HTN/tender L LS erectors & BL g med L>R and L TFL  SLR 70 BL  Mild pain L FAIR   LE Achilles 2+ BL    Segmental joint dysfunction was identified in the following areas using motion palpation and/or pain provocation assessment:  Cervical:   Thoracic: 8-10  Lumbopelvic:  1-2, BL SIJ    Physical Exam  Neurological:      Mental Status: She is alert.      Sensory: Sensation is intact.      Motor: Motor function is intact.      Coordination: Coordination is intact.      Gait: Gait is intact.      Deep Tendon Reflexes:      Reflex Scores:       Patellar reflexes are 2+ on the right side and 2+ on the left side.       Achilles reflexes are 2+ on the right side and 2+ on the left side.     Comments: ENMANUEL JOSEPHL 12/20/23         Plan   Today's  treatment:  Dry needling (10 in, 10 out) to region of the chief complaint / hypertonic muscles identified upon palpation in L g med & TFL and STM of these muscles  SMT to regions of segmental dysfunction identified on exam, using age-appropriate force, and manual diversified technique.   Manual dynamic stretching of the gluteal muscles, hamstrings BL  12:24 to 12:40 PM  Patient noted reduced pain and improved mobility post-treatment    Treatment Plan:   The patient and I discussed the risks and benefits of chiropractic care. Based on the patient's subjective complaints along with the examination findings, it is advised that a course of chiropractic treatment by initiated. The patient provided consent for care. The patient tolerated today's treatment with little or no additional discomfort and was instructed to contact the office for questions or concerns. Will see patient once per week then every 2 weeks when symptoms become mild/manageable, further spaced apart contingent upon improvement.     This chart note was generated using dictation software, and as such, there may be typographical errors present. Abbreviations: Cervical spine (CS), cervical-thoracic (CT), Dry needling (DN), Flexion adduction internal rotation (FAIR), high velocity, low amplitude (HVLA), Lumbar spine (LS), Soft tissue manipulation (STM), spinal manipulative therapy (SMT), Straight leg raise (SLR), Thoracic spine (TS).

## 2024-02-23 ENCOUNTER — ALLIED HEALTH (OUTPATIENT)
Dept: INTEGRATIVE MEDICINE | Facility: CLINIC | Age: 59
End: 2024-02-23
Payer: COMMERCIAL

## 2024-02-23 DIAGNOSIS — M99.02 SOMATIC DYSFUNCTION OF THORACIC REGION: Primary | ICD-10-CM

## 2024-02-23 DIAGNOSIS — M25.559 GREATER TROCHANTERIC PAIN SYNDROME: ICD-10-CM

## 2024-02-23 DIAGNOSIS — M99.05 SOMATIC DYSFUNCTION OF PELVIS REGION: ICD-10-CM

## 2024-02-23 DIAGNOSIS — M99.03 SOMATIC DYSFUNCTION OF LUMBAR REGION: ICD-10-CM

## 2024-02-23 PROCEDURE — 97140 MANUAL THERAPY 1/> REGIONS: CPT | Performed by: CHIROPRACTOR

## 2024-02-23 PROCEDURE — 98941 CHIROPRACT MANJ 3-4 REGIONS: CPT | Performed by: CHIROPRACTOR

## 2024-04-03 ENCOUNTER — TELEPHONE (OUTPATIENT)
Dept: SCHEDULING | Age: 59
End: 2024-04-03

## 2024-04-03 ENCOUNTER — OFFICE VISIT (OUTPATIENT)
Dept: PRIMARY CARE | Facility: CLINIC | Age: 59
End: 2024-04-03
Payer: COMMERCIAL

## 2024-04-03 VITALS
OXYGEN SATURATION: 98 % | HEIGHT: 64 IN | RESPIRATION RATE: 14 BRPM | HEART RATE: 84 BPM | TEMPERATURE: 97.3 F | BODY MASS INDEX: 23.22 KG/M2 | SYSTOLIC BLOOD PRESSURE: 120 MMHG | DIASTOLIC BLOOD PRESSURE: 88 MMHG | WEIGHT: 136 LBS

## 2024-04-03 DIAGNOSIS — F41.9 ANXIETY: ICD-10-CM

## 2024-04-03 DIAGNOSIS — R92.8 ABNORMAL MAMMOGRAM: Primary | ICD-10-CM

## 2024-04-03 PROCEDURE — 1036F TOBACCO NON-USER: CPT | Performed by: INTERNAL MEDICINE

## 2024-04-03 PROCEDURE — 99214 OFFICE O/P EST MOD 30 MIN: CPT | Performed by: INTERNAL MEDICINE

## 2024-04-03 RX ORDER — SERTRALINE HYDROCHLORIDE 50 MG/1
50 TABLET, FILM COATED ORAL DAILY
Qty: 30 TABLET | Refills: 5 | Status: SHIPPED | OUTPATIENT
Start: 2024-04-03 | End: 2024-09-30

## 2024-04-03 NOTE — PROGRESS NOTES
"Subjective    Kaylie Doty is a 59 y.o. female who presents for Anxiety and Breast Problem.  HPI  C/o anxiety, has been stressed   She has life and work stress. She has had this in years past.   She has been on sertraline in the past and she was only for 6 months.     Would like breast exam. Area of bx seems larger.     Review of Systems   All other systems reviewed and are negative.        Objective     /88 (BP Location: Left arm, Patient Position: Sitting, BP Cuff Size: Adult)   Pulse 84   Temp 36.3 °C (97.3 °F) (Skin)   Resp 14   Ht 1.626 m (5' 4\")   Wt 61.7 kg (136 lb)   SpO2 98%   BMI 23.34 kg/m²    Physical Exam  Vitals reviewed.   Constitutional:       General: She is not in acute distress.     Appearance: Normal appearance.   Cardiovascular:      Rate and Rhythm: Normal rate and regular rhythm.      Pulses: Normal pulses.      Heart sounds: Normal heart sounds.   Pulmonary:      Effort: Pulmonary effort is normal.      Breath sounds: Normal breath sounds.   Chest:          Comments: There is hard round mass left breast upper outer quadrant  Abdominal:      Tenderness: There is no abdominal tenderness.   Musculoskeletal:         General: No swelling.   Skin:     General: Skin is warm and dry.   Neurological:      Mental Status: She is alert.       Health Maintenance Due   Topic Date Due    Hepatitis B Vaccines (1 of 3 - 3-dose series) Never done    HIV Screening  Never done    MMR Vaccines (1 of 1 - Standard series) Never done    DTaP/Tdap/Td Vaccines (1 - Tdap) Never done    Zoster Vaccines (2 of 2) 04/07/2020    COVID-19 Vaccine (4 - 2023-24 season) 09/01/2023    TSH Level  11/30/2023          Assessment/Plan   Problem List Items Addressed This Visit       Abnormal mammogram - Primary    Relevant Orders    BI mammo left diagnostic tomosynthesis     Other Visit Diagnoses       Anxiety        Relevant Medications    sertraline (Zoloft) 50 mg tablet        Start sertraline. She has been on it " before. Side effects discussed.  Check mamm left diagnostic   Call  with any problems or questions.   Follow up in a month if needed

## 2024-04-09 ENCOUNTER — TELEPHONE (OUTPATIENT)
Dept: PRIMARY CARE | Facility: CLINIC | Age: 59
End: 2024-04-09
Payer: COMMERCIAL

## 2024-04-09 ENCOUNTER — TELEPHONE (OUTPATIENT)
Dept: SCHEDULING | Age: 59
End: 2024-04-09
Payer: COMMERCIAL

## 2024-04-09 DIAGNOSIS — N63.20 MASS OF LEFT BREAST, UNSPECIFIED QUADRANT: Primary | ICD-10-CM

## 2024-04-09 NOTE — TELEPHONE ENCOUNTER
----- Message from Roseline Phipps sent at 4/9/2024 11:59 AM EDT -----  Scheduled diag mamm and breast US May 3 at   ----- Message -----  From: Eli Tolentino LPN  Sent: 4/9/2024  10:19 AM EDT  To: Roseline Phipps    Please see below.    ----- Message -----  From: Kaylie Beebe DO  Sent: 4/9/2024  10:08 AM EDT  To: Eli Tolentino LPN    ordered  ----- Message -----  From: Eli Tolentino LPN  Sent: 4/8/2024   4:45 PM EDT  To: Kaylie Beebe DO      ----- Message -----  From: Roseline Phipps  Sent: 4/5/2024   2:43 PM EDT  To: Felipe Alvarez CMA    Patient went to  for her scheduled diagnostic mamm.  She was told that she could not have a mammogram for 6 mos after the last one.  She was told that she could have a US.  Could she have an order for the breast US?

## 2024-05-03 ENCOUNTER — HOSPITAL ENCOUNTER (OUTPATIENT)
Dept: RADIOLOGY | Facility: HOSPITAL | Age: 59
Discharge: HOME | End: 2024-05-03
Payer: COMMERCIAL

## 2024-05-03 VITALS — WEIGHT: 132 LBS | HEIGHT: 64 IN | BODY MASS INDEX: 22.53 KG/M2

## 2024-05-03 DIAGNOSIS — N63.20 MASS OF LEFT BREAST, UNSPECIFIED QUADRANT: ICD-10-CM

## 2024-05-03 PROCEDURE — 77061 BREAST TOMOSYNTHESIS UNI: CPT | Mod: LEFT SIDE | Performed by: RADIOLOGY

## 2024-05-03 PROCEDURE — 77061 BREAST TOMOSYNTHESIS UNI: CPT | Mod: LT

## 2024-05-03 PROCEDURE — 77065 DX MAMMO INCL CAD UNI: CPT | Mod: LEFT SIDE | Performed by: RADIOLOGY

## 2024-05-03 PROCEDURE — 76642 ULTRASOUND BREAST LIMITED: CPT | Mod: LEFT SIDE | Performed by: RADIOLOGY

## 2024-05-03 PROCEDURE — 76982 USE 1ST TARGET LESION: CPT | Mod: LT

## 2024-05-03 PROCEDURE — 76642 ULTRASOUND BREAST LIMITED: CPT | Mod: LT

## 2024-05-07 ENCOUNTER — OFFICE VISIT (OUTPATIENT)
Dept: SURGICAL ONCOLOGY | Facility: CLINIC | Age: 59
End: 2024-05-07
Payer: COMMERCIAL

## 2024-05-07 DIAGNOSIS — D24.2 BREAST FIBROADENOMA, LEFT: ICD-10-CM

## 2024-05-07 PROCEDURE — 99214 OFFICE O/P EST MOD 30 MIN: CPT | Performed by: SURGERY

## 2024-05-07 NOTE — H&P (VIEW-ONLY)
BREAST SURGICAL ONCOLOGY FOLLOW UP     Assessment/Plan     Left breast biopsy proven fibroadenoma at 1:30 7cmFN measuring 2.5cm  -surgical excision recommended given interval growth.    Fibroadenoma is a benign breast mass that is very common especially in women under 30.  The cause of fibroadenomas is unknown but estrogen may play a role in their development.  Options for management include biopsy with clinical follow up, surgical excision, or follow up exam with ultrasound to demonstrate stability over time.  For fibroadenomas that become painful or enlarge over time surgical excision is recommended.    Scheduled for left breast excisional biopsy on 5/24/2024.  Risks, benefits, and perioperative expectations discussed in detail prior to agreeing to proceed with surgery.    Subjective   Kaylie Doty is a 59 y.o. female presents today for follow up of a left breast fibroadenoma.    Saw Dr. Walker in 2022 for a left breast mass.  Mass measured 1.5cm at the time.    U/S biopsy revealed a benign fibroadenoma.    Pt appreciated mass increasing in size.  Repeat imaging now shows mass measuring 2.5cm. Surgical excision recommended given interval growth.    Review of Systems   Constitutional symptoms: Denies generalized fatigue.  Denies weight change, fevers/chills, difficulty sleeping   Eyes: Denies double vision, glaucoma, cataracts.  Ear/nose/throat/mouth: Denies hearing changes, sore throat, sinus problems.  Cardiovascular: No chest pain.  Denies irregular heartbeat.  Denies ankle swelling.  Respiratory: No wheezing, cough, or shortness of breath.  Gastrointestinal: No abdominal pain,  No nausea/vomiting.  No indigestion/heartburn.  No change in bowel habits.  No constipation or diarrhea.   Genitourinary: No urinary incontinence.  No urinary frequency.  No painful urination.  Musculoskeletal: No bone pain, no muscle pain, no joint pain.   Integumentary: No rash. No masses.  No changes in moles.  No easy  bruising.  Neurological: No headaches.  No tremors. No numbness/tingling.  Psychiatric: No anxiety. No depression.  Endocrine: No excessive thirst.  Not too hot or too cold.  Not tired or fatigued.    Hematological/lymphatic: No swollen glands or blood clotting problems.  No bruising.    Objective   Physical Exam  General: Alert and oriented x 3.  Mood and affect are appropriate.  HEENT: EOMI, PERRLA.   Neck: supple, no masses, no cervical adenopathy.  Cardiovascular: no lower extremity edema.  Pulmonary: breathing non labored on room air.  GI: Abdomen soft, no masses. No hepatomegaly or splenomegaly.  Lymph nodes: No supraclavicular or axillary adenopathy bilaterally.  Musculoskeletal: Full range of motion in the upper extremities bilaterally.  Neuro: denies dizziness, tremors    Breasts: The breasts were examined in both the seated and supine positions.    RIGHT: The nipple is everted without nipple discharge.  There are no skin changes, skin thickening, or dimpling. There are no masses palpated in the RIGHT breast.   LEFT: The nipple is everted without nipple discharge.  There are no skin changes, skin thickening, or dimpling. There is a mobile 2.5cm mass in the UOQ at 1:30 clinically resembling a fibroadenoma.       Radiology review: All images and reports were personally reviewed.         Beatriz Lewis DO

## 2024-05-20 ENCOUNTER — LAB (OUTPATIENT)
Dept: LAB | Facility: LAB | Age: 59
End: 2024-05-20
Payer: COMMERCIAL

## 2024-05-20 DIAGNOSIS — D24.2 BREAST FIBROADENOMA, LEFT: ICD-10-CM

## 2024-05-20 LAB
ANION GAP SERPL CALC-SCNC: 12 MMOL/L (ref 10–20)
BUN SERPL-MCNC: 22 MG/DL (ref 6–23)
CALCIUM SERPL-MCNC: 9.2 MG/DL (ref 8.6–10.3)
CHLORIDE SERPL-SCNC: 100 MMOL/L (ref 98–107)
CO2 SERPL-SCNC: 29 MMOL/L (ref 21–32)
CREAT SERPL-MCNC: 0.86 MG/DL (ref 0.5–1.05)
EGFRCR SERPLBLD CKD-EPI 2021: 79 ML/MIN/1.73M*2
ERYTHROCYTE [DISTWIDTH] IN BLOOD BY AUTOMATED COUNT: 12.6 % (ref 11.5–14.5)
GLUCOSE SERPL-MCNC: 83 MG/DL (ref 74–99)
HCT VFR BLD AUTO: 37 % (ref 36–46)
HGB BLD-MCNC: 12 G/DL (ref 12–16)
MCH RBC QN AUTO: 29.6 PG (ref 26–34)
MCHC RBC AUTO-ENTMCNC: 32.4 G/DL (ref 32–36)
MCV RBC AUTO: 91 FL (ref 80–100)
NRBC BLD-RTO: 0 /100 WBCS (ref 0–0)
PLATELET # BLD AUTO: 306 X10*3/UL (ref 150–450)
POTASSIUM SERPL-SCNC: 4.8 MMOL/L (ref 3.5–5.3)
RBC # BLD AUTO: 4.06 X10*6/UL (ref 4–5.2)
SODIUM SERPL-SCNC: 136 MMOL/L (ref 136–145)
WBC # BLD AUTO: 7.7 X10*3/UL (ref 4.4–11.3)

## 2024-05-20 PROCEDURE — 80048 BASIC METABOLIC PNL TOTAL CA: CPT

## 2024-05-20 PROCEDURE — 85027 COMPLETE CBC AUTOMATED: CPT

## 2024-05-20 PROCEDURE — 36415 COLL VENOUS BLD VENIPUNCTURE: CPT

## 2024-05-24 ENCOUNTER — HOSPITAL ENCOUNTER (OUTPATIENT)
Facility: HOSPITAL | Age: 59
Setting detail: OUTPATIENT SURGERY
Discharge: HOME | End: 2024-05-24
Attending: SURGERY | Admitting: SURGERY
Payer: COMMERCIAL

## 2024-05-24 ENCOUNTER — ANESTHESIA (OUTPATIENT)
Dept: OPERATING ROOM | Facility: HOSPITAL | Age: 59
End: 2024-05-24
Payer: COMMERCIAL

## 2024-05-24 ENCOUNTER — APPOINTMENT (OUTPATIENT)
Dept: CARDIOLOGY | Facility: HOSPITAL | Age: 59
End: 2024-05-24
Payer: COMMERCIAL

## 2024-05-24 ENCOUNTER — APPOINTMENT (OUTPATIENT)
Dept: RADIOLOGY | Facility: HOSPITAL | Age: 59
End: 2024-05-24
Payer: COMMERCIAL

## 2024-05-24 ENCOUNTER — ANESTHESIA EVENT (OUTPATIENT)
Dept: OPERATING ROOM | Facility: HOSPITAL | Age: 59
End: 2024-05-24
Payer: COMMERCIAL

## 2024-05-24 VITALS
DIASTOLIC BLOOD PRESSURE: 73 MMHG | HEIGHT: 64 IN | RESPIRATION RATE: 16 BRPM | BODY MASS INDEX: 22.66 KG/M2 | OXYGEN SATURATION: 99 % | HEART RATE: 70 BPM | SYSTOLIC BLOOD PRESSURE: 140 MMHG | TEMPERATURE: 97.9 F

## 2024-05-24 DIAGNOSIS — D24.2 BREAST FIBROADENOMA, LEFT: ICD-10-CM

## 2024-05-24 DIAGNOSIS — R11.2 PONV (POSTOPERATIVE NAUSEA AND VOMITING): Primary | ICD-10-CM

## 2024-05-24 DIAGNOSIS — Z98.890 PONV (POSTOPERATIVE NAUSEA AND VOMITING): Primary | ICD-10-CM

## 2024-05-24 PROCEDURE — 2500000004 HC RX 250 GENERAL PHARMACY W/ HCPCS (ALT 636 FOR OP/ED): Performed by: ANESTHESIOLOGIST ASSISTANT

## 2024-05-24 PROCEDURE — 2500000005 HC RX 250 GENERAL PHARMACY W/O HCPCS: Performed by: SURGERY

## 2024-05-24 PROCEDURE — 19120 REMOVAL OF BREAST LESION: CPT | Performed by: SURGERY

## 2024-05-24 PROCEDURE — 3600000008 HC OR TIME - EACH INCREMENTAL 1 MINUTE - PROCEDURE LEVEL THREE: Performed by: SURGERY

## 2024-05-24 PROCEDURE — 2500000005 HC RX 250 GENERAL PHARMACY W/O HCPCS: Performed by: ANESTHESIOLOGIST ASSISTANT

## 2024-05-24 PROCEDURE — 2500000005 HC RX 250 GENERAL PHARMACY W/O HCPCS: Performed by: ANESTHESIOLOGY

## 2024-05-24 PROCEDURE — 2720000007 HC OR 272 NO HCPCS: Performed by: SURGERY

## 2024-05-24 PROCEDURE — 3700000002 HC GENERAL ANESTHESIA TIME - EACH INCREMENTAL 1 MINUTE: Performed by: SURGERY

## 2024-05-24 PROCEDURE — 88305 TISSUE EXAM BY PATHOLOGIST: CPT | Mod: TC,STJLAB | Performed by: SURGERY

## 2024-05-24 PROCEDURE — 7100000009 HC PHASE TWO TIME - INITIAL BASE CHARGE: Performed by: SURGERY

## 2024-05-24 PROCEDURE — 3600000003 HC OR TIME - INITIAL BASE CHARGE - PROCEDURE LEVEL THREE: Performed by: SURGERY

## 2024-05-24 PROCEDURE — 7100000002 HC RECOVERY ROOM TIME - EACH INCREMENTAL 1 MINUTE: Performed by: SURGERY

## 2024-05-24 PROCEDURE — A19120 PR EXCISE BREAST CYST: Performed by: ANESTHESIOLOGIST ASSISTANT

## 2024-05-24 PROCEDURE — 7100000010 HC PHASE TWO TIME - EACH INCREMENTAL 1 MINUTE: Performed by: SURGERY

## 2024-05-24 PROCEDURE — 3700000001 HC GENERAL ANESTHESIA TIME - INITIAL BASE CHARGE: Performed by: SURGERY

## 2024-05-24 PROCEDURE — A19120 PR EXCISE BREAST CYST: Performed by: ANESTHESIOLOGY

## 2024-05-24 PROCEDURE — 93005 ELECTROCARDIOGRAM TRACING: CPT | Mod: 59

## 2024-05-24 PROCEDURE — 76098 X-RAY EXAM SURGICAL SPECIMEN: CPT | Mod: LT

## 2024-05-24 PROCEDURE — 7100000001 HC RECOVERY ROOM TIME - INITIAL BASE CHARGE: Performed by: SURGERY

## 2024-05-24 RX ORDER — LABETALOL HYDROCHLORIDE 5 MG/ML
5 INJECTION, SOLUTION INTRAVENOUS ONCE AS NEEDED
Status: DISCONTINUED | OUTPATIENT
Start: 2024-05-24 | End: 2024-05-24 | Stop reason: HOSPADM

## 2024-05-24 RX ORDER — PROPOFOL 10 MG/ML
INJECTION, EMULSION INTRAVENOUS AS NEEDED
Status: DISCONTINUED | OUTPATIENT
Start: 2024-05-24 | End: 2024-05-24

## 2024-05-24 RX ORDER — ONDANSETRON HYDROCHLORIDE 2 MG/ML
4 INJECTION, SOLUTION INTRAVENOUS ONCE AS NEEDED
Status: DISCONTINUED | OUTPATIENT
Start: 2024-05-24 | End: 2024-05-24 | Stop reason: HOSPADM

## 2024-05-24 RX ORDER — OXYCODONE HYDROCHLORIDE 5 MG/1
5 TABLET ORAL EVERY 4 HOURS PRN
Status: DISCONTINUED | OUTPATIENT
Start: 2024-05-24 | End: 2024-05-24 | Stop reason: HOSPADM

## 2024-05-24 RX ORDER — LIDOCAINE HYDROCHLORIDE 20 MG/ML
INJECTION, SOLUTION INFILTRATION; PERINEURAL AS NEEDED
Status: DISCONTINUED | OUTPATIENT
Start: 2024-05-24 | End: 2024-05-24

## 2024-05-24 RX ORDER — KETOROLAC TROMETHAMINE 30 MG/ML
INJECTION, SOLUTION INTRAMUSCULAR; INTRAVENOUS AS NEEDED
Status: DISCONTINUED | OUTPATIENT
Start: 2024-05-24 | End: 2024-05-24

## 2024-05-24 RX ORDER — HYDRALAZINE HYDROCHLORIDE 20 MG/ML
5 INJECTION INTRAMUSCULAR; INTRAVENOUS EVERY 30 MIN PRN
Status: DISCONTINUED | OUTPATIENT
Start: 2024-05-24 | End: 2024-05-24 | Stop reason: HOSPADM

## 2024-05-24 RX ORDER — BUPIVACAINE HYDROCHLORIDE 2.5 MG/ML
INJECTION, SOLUTION INFILTRATION; PERINEURAL AS NEEDED
Status: DISCONTINUED | OUTPATIENT
Start: 2024-05-24 | End: 2024-05-24 | Stop reason: HOSPADM

## 2024-05-24 RX ORDER — ACETAMINOPHEN 325 MG/1
975 TABLET ORAL ONCE
Status: DISCONTINUED | OUTPATIENT
Start: 2024-05-24 | End: 2024-05-24 | Stop reason: HOSPADM

## 2024-05-24 RX ORDER — SODIUM CHLORIDE, SODIUM LACTATE, POTASSIUM CHLORIDE, CALCIUM CHLORIDE 600; 310; 30; 20 MG/100ML; MG/100ML; MG/100ML; MG/100ML
INJECTION, SOLUTION INTRAVENOUS CONTINUOUS PRN
Status: DISCONTINUED | OUTPATIENT
Start: 2024-05-24 | End: 2024-05-24

## 2024-05-24 RX ORDER — ALBUTEROL SULFATE 0.83 MG/ML
2.5 SOLUTION RESPIRATORY (INHALATION) ONCE AS NEEDED
Status: DISCONTINUED | OUTPATIENT
Start: 2024-05-24 | End: 2024-05-24 | Stop reason: HOSPADM

## 2024-05-24 RX ORDER — ONDANSETRON HYDROCHLORIDE 2 MG/ML
INJECTION, SOLUTION INTRAVENOUS AS NEEDED
Status: DISCONTINUED | OUTPATIENT
Start: 2024-05-24 | End: 2024-05-24

## 2024-05-24 RX ORDER — FENTANYL CITRATE 50 UG/ML
INJECTION, SOLUTION INTRAMUSCULAR; INTRAVENOUS AS NEEDED
Status: DISCONTINUED | OUTPATIENT
Start: 2024-05-24 | End: 2024-05-24

## 2024-05-24 RX ORDER — DIPHENHYDRAMINE HYDROCHLORIDE 50 MG/ML
12.5 INJECTION INTRAMUSCULAR; INTRAVENOUS ONCE AS NEEDED
Status: DISCONTINUED | OUTPATIENT
Start: 2024-05-24 | End: 2024-05-24 | Stop reason: HOSPADM

## 2024-05-24 RX ORDER — CEFAZOLIN SODIUM 2 G/100ML
INJECTION, SOLUTION INTRAVENOUS AS NEEDED
Status: DISCONTINUED | OUTPATIENT
Start: 2024-05-24 | End: 2024-05-24

## 2024-05-24 RX ORDER — OXYCODONE AND ACETAMINOPHEN 5; 325 MG/1; MG/1
1 TABLET ORAL EVERY 4 HOURS PRN
Status: DISCONTINUED | OUTPATIENT
Start: 2024-05-24 | End: 2024-05-24 | Stop reason: HOSPADM

## 2024-05-24 RX ORDER — ONDANSETRON 4 MG/1
4 TABLET, FILM COATED ORAL EVERY 6 HOURS PRN
Qty: 20 TABLET | Refills: 0 | Status: SHIPPED | OUTPATIENT
Start: 2024-05-24

## 2024-05-24 RX ORDER — LIDOCAINE HYDROCHLORIDE 10 MG/ML
0.1 INJECTION INFILTRATION; PERINEURAL ONCE
Status: DISCONTINUED | OUTPATIENT
Start: 2024-05-24 | End: 2024-05-24 | Stop reason: HOSPADM

## 2024-05-24 RX ORDER — SODIUM CHLORIDE, SODIUM LACTATE, POTASSIUM CHLORIDE, CALCIUM CHLORIDE 600; 310; 30; 20 MG/100ML; MG/100ML; MG/100ML; MG/100ML
100 INJECTION, SOLUTION INTRAVENOUS CONTINUOUS
Status: DISCONTINUED | OUTPATIENT
Start: 2024-05-24 | End: 2024-05-24 | Stop reason: HOSPADM

## 2024-05-24 RX ORDER — MIDAZOLAM HYDROCHLORIDE 1 MG/ML
INJECTION, SOLUTION INTRAMUSCULAR; INTRAVENOUS AS NEEDED
Status: DISCONTINUED | OUTPATIENT
Start: 2024-05-24 | End: 2024-05-24

## 2024-05-24 RX ADMIN — FENTANYL CITRATE 25 MCG: 50 INJECTION, SOLUTION INTRAMUSCULAR; INTRAVENOUS at 15:15

## 2024-05-24 RX ADMIN — KETOROLAC TROMETHAMINE 30 MG: 30 INJECTION, SOLUTION INTRAMUSCULAR; INTRAVENOUS at 15:15

## 2024-05-24 RX ADMIN — DEXAMETHASONE SODIUM PHOSPHATE 4 MG: 4 INJECTION INTRA-ARTICULAR; INTRALESIONAL; INTRAMUSCULAR; INTRAVENOUS; SOFT TISSUE at 14:50

## 2024-05-24 RX ADMIN — PROPOFOL 500 MG: 10 INJECTION, EMULSION INTRAVENOUS at 14:46

## 2024-05-24 RX ADMIN — CEFAZOLIN SODIUM 2 G: 2 INJECTION, SOLUTION INTRAVENOUS at 14:49

## 2024-05-24 RX ADMIN — PROPOFOL 200 MG: 10 INJECTION, EMULSION INTRAVENOUS at 14:45

## 2024-05-24 RX ADMIN — Medication 6 L/MIN: at 15:36

## 2024-05-24 RX ADMIN — MIDAZOLAM 2 MG: 1 INJECTION INTRAMUSCULAR; INTRAVENOUS at 14:34

## 2024-05-24 RX ADMIN — ONDANSETRON 4 MG: 2 INJECTION, SOLUTION INTRAMUSCULAR; INTRAVENOUS at 15:15

## 2024-05-24 RX ADMIN — FENTANYL CITRATE 50 MCG: 50 INJECTION, SOLUTION INTRAMUSCULAR; INTRAVENOUS at 14:56

## 2024-05-24 RX ADMIN — FENTANYL CITRATE 25 MCG: 50 INJECTION, SOLUTION INTRAMUSCULAR; INTRAVENOUS at 14:45

## 2024-05-24 RX ADMIN — SODIUM CHLORIDE, POTASSIUM CHLORIDE, SODIUM LACTATE AND CALCIUM CHLORIDE: 600; 310; 30; 20 INJECTION, SOLUTION INTRAVENOUS at 14:34

## 2024-05-24 RX ADMIN — LIDOCAINE HYDROCHLORIDE 80 MG: 20 INJECTION, SOLUTION INFILTRATION; PERINEURAL at 14:45

## 2024-05-24 SDOH — HEALTH STABILITY: MENTAL HEALTH: CURRENT SMOKER: 0

## 2024-05-24 ASSESSMENT — PAIN - FUNCTIONAL ASSESSMENT
PAIN_FUNCTIONAL_ASSESSMENT: 0-10

## 2024-05-24 ASSESSMENT — PAIN SCALES - GENERAL
PAINLEVEL_OUTOF10: 2
PAINLEVEL_OUTOF10: 4
PAINLEVEL_OUTOF10: 4
PAINLEVEL_OUTOF10: 2
PAINLEVEL_OUTOF10: 5 - MODERATE PAIN
PAINLEVEL_OUTOF10: 5 - MODERATE PAIN

## 2024-05-24 ASSESSMENT — COLUMBIA-SUICIDE SEVERITY RATING SCALE - C-SSRS
2. HAVE YOU ACTUALLY HAD ANY THOUGHTS OF KILLING YOURSELF?: NO
6. HAVE YOU EVER DONE ANYTHING, STARTED TO DO ANYTHING, OR PREPARED TO DO ANYTHING TO END YOUR LIFE?: NO
1. IN THE PAST MONTH, HAVE YOU WISHED YOU WERE DEAD OR WISHED YOU COULD GO TO SLEEP AND NOT WAKE UP?: NO

## 2024-05-24 NOTE — OP NOTE
Date: 2024  OR Location: [facility]    Name: Kaylie Doty, : 1965, Age: 59 y.o., MRN: [id number], Sex: female    Diagnosis  Pre-op Diagnosis     * Breast fibroadenoma, left [D24.2] Post-op Diagnosis     * Breast fibroadenoma, left [D24.2]     Procedures  LEFT EXCISIONAL BREAST BIOPSY  99485 - AR EXC CYST/ABERRANT BREAST TISSUE OPEN 1/> LESION      Surgeons      * Beatriz Lewis - Primary    Resident/Fellow/Other Assistant:  Surgeons and Role:     * Arvin Mckeon PA-C - Assisting    Procedure Summary  Anesthesia: General  ASA: II  Anesthesia Staff: Anesthesiologist: Devonte Montoya MD  C-AA: LUCIA Sims  Estimated Blood Loss: 5mL    Staff:   Circulator: Junie Duenas RN  Scrub Person: Raquel Cronin    Details of Procedure:    The patient was identified by name and birthdate and transported to the operative suite and placed supine on the OR table.  A sign-in was performed verifying patient identity, procedure and laterality.  One dose of preoperative antibiotics was given.  After induction of anesthesia the left breast and axilla were prepped and draped in the usual sterile fashion.  Ultrasound was used to confirm the location of the lesion to be excised.  Just prior to incision, a time-out was performed confirming patient identity, procedure and laterality.  A lateral IMF incision was made, and flaps were raised in the direction of the targeted lesion.  The target lesion was then circumferentially dissected using electrocautery.  Once the specimen was completely dissected, it was marked for orientation using a silk suture- short suture superior, long lateral and passed off the field.   Specimen radiograph was performed confirming the presence of the targeted lesion and biopsy clip.  Next, meticulous hemostasis was achieved.  The breast tissue was reapproximated in multiple layers to recreate the breast mound.  The dermis was closed with 3-0 Vicryl suture and the skin was closed with  a running 4-0 Monocryl.  Benzoin and steri-strips were used as dressing.  The patient was then awoken from anesthesia and transported to the PACU in satisfactory condition.    SPECIMEN:    Left breast excisional biopsy- short suture superior, long lateral

## 2024-05-24 NOTE — ANESTHESIA PREPROCEDURE EVALUATION
Patient: Kaylie Doty    Procedure Information       Anesthesia Start Date/Time: 05/24/24 1434    Procedure: LEFT EXCISIONAL BREAST BIOPSY (Left)    Location: [facility] OR 03 / [facility]    Surgeons: Beatriz Lewis, DO            Relevant Problems   Anesthesia   (+) PONV (postoperative nausea and vomiting)      Neuro   (+) Anxiety disorder   (+) Lumbar radiculopathy      GI   (+) Irritable bowel syndrome with predominant constipation      ID   (+) COVID-19       Clinical information reviewed:   Tobacco  Allergies  Meds   Med Hx  Surg Hx  OB Status  Fam Hx  Soc   Hx        NPO Detail:  NPO/Void Status  Carbohydrate Drink Given Prior to Surgery? : N  Date of Last Liquid: 05/24/24  Time of Last Liquid: 0930  Date of Last Solid: 05/23/24  Time of Last Solid: 2030  Last Intake Type: Clear fluids  Time of Last Void: 1313         Physical Exam    Airway  Mallampati: I  TM distance: >3 FB  Neck ROM: full     Cardiovascular   Rhythm: regular  Rate: normal     Dental - normal exam     Pulmonary   Breath sounds clear to auscultation     Abdominal            Anesthesia Plan    History of general anesthesia?: yes  History of complications of general anesthesia?: no    ASA 2     general     The patient is not a current smoker.    intravenous induction   Anesthetic plan and risks discussed with patient.    Plan discussed with CAA.

## 2024-05-24 NOTE — ANESTHESIA POSTPROCEDURE EVALUATION
Patient: aKylie Doty    Procedure Summary       Date: 05/24/24 Room / Location: [facility] OR 03 / [facility]    Anesthesia Start: 1434 Anesthesia Stop: 1540    Procedure: LEFT EXCISIONAL BREAST BIOPSY (Left) Diagnosis:       Breast fibroadenoma, left      (Breast fibroadenoma, left [D24.2])    Surgeons: Beatriz Lewis DO Responsible Provider: Devonte Montoya MD    Anesthesia Type: general ASA Status: 2            Anesthesia Type: general    Vitals Value Taken Time   /73 05/24/24 1600   Temp 36.1 °C (97 °F) 05/24/24 1536   Pulse 74 05/24/24 1609   Resp 13 05/24/24 1609   SpO2 99 % 05/24/24 1609   Vitals shown include unfiled device data.    Anesthesia Post Evaluation    Patient location during evaluation: PACU  Patient participation: complete - patient participated  Level of consciousness: awake and alert  Pain management: satisfactory to patient  Airway patency: patent  Cardiovascular status: acceptable  Respiratory status: acceptable  Hydration status: acceptable  Postoperative Nausea and Vomiting: none        No notable events documented.

## 2024-05-24 NOTE — ANESTHESIA PROCEDURE NOTES
Airway  Date/Time: 5/24/2024 2:46 PM  Urgency: elective    Airway not difficult    Staffing  Performed: LUCIA   Authorized by: Devonte Montoya MD    Performed by: LUCIA Sims  Patient location during procedure: OR    Indications and Patient Condition  Indications for airway management: anesthesia  Spontaneous Ventilation: absent  Sedation level: deep  Preoxygenated: yes  Patient position: sniffing  Mask difficulty assessment: 0 - not attempted    Final Airway Details  Final airway type: supraglottic airway      Successful airway: Size 4     Number of attempts at approach: 1

## 2024-05-27 LAB
ATRIAL RATE: 91 BPM
P AXIS: 70 DEGREES
P OFFSET: 197 MS
P ONSET: 139 MS
PR INTERVAL: 170 MS
Q ONSET: 224 MS
QRS COUNT: 15 BEATS
QRS DURATION: 64 MS
QT INTERVAL: 360 MS
QTC CALCULATION(BAZETT): 442 MS
QTC FREDERICIA: 414 MS
R AXIS: 53 DEGREES
T AXIS: 72 DEGREES
T OFFSET: 404 MS
VENTRICULAR RATE: 91 BPM

## 2024-05-30 ENCOUNTER — TELEPHONE (OUTPATIENT)
Dept: SURGICAL ONCOLOGY | Facility: HOSPITAL | Age: 59
End: 2024-05-30
Payer: COMMERCIAL

## 2024-05-30 NOTE — TELEPHONE ENCOUNTER
Spoke with Ms. Doty to check-in post-operatively.  States she developed a rash and neck swelling on post-op day 1.  Denies any redness or rash to the breast.  Pain controlled with Tylenol and motrin.  Endorses she recently had eye surgery and had a reaction to antibiotic drops.  Advised possible sensitivity to Ancef given pre-op.  She has been self-medicating with Claritin and Benadryl.  Confirmed she will be at her post-op appointment 6/11/24.

## 2024-05-31 LAB
LABORATORY COMMENT REPORT: NORMAL
PATH REPORT.FINAL DX SPEC: NORMAL
PATH REPORT.GROSS SPEC: NORMAL
PATH REPORT.RELEVANT HX SPEC: NORMAL
PATH REPORT.TOTAL CANCER: NORMAL

## 2024-06-04 ENCOUNTER — OFFICE VISIT (OUTPATIENT)
Dept: SURGICAL ONCOLOGY | Facility: CLINIC | Age: 59
End: 2024-06-04
Payer: COMMERCIAL

## 2024-06-04 DIAGNOSIS — Z12.31 SCREENING MAMMOGRAM FOR HIGH-RISK PATIENT: ICD-10-CM

## 2024-06-04 PROCEDURE — 99024 POSTOP FOLLOW-UP VISIT: CPT | Performed by: SURGERY

## 2024-06-05 NOTE — PROGRESS NOTES
BREAST SURGERY POST OPERATIVE VISIT    Assessment/Plan     Left breast biopsy proven fibroadenoma at 1:30 7cmFN measuring 2.5cm with associated ALH on excision    Final pathology with fibroadenoma and ALH.  Atypia is a risk factor for breast cancer.  Recommend establishing in high risk clinic.    Due for mammogram in December.  May consider supplemental screening pending high risk discussion.    Ok to resume activity as tolerated.      David Doty is a 59 y.o. female here for post op visit s/p left breast excisional biopsy.    Date of surgery: 5/24/2024  Pathology     FINAL DIAGNOSIS   A. Left breast mass, excision:  -- Atypical lobular hyperplasia.  -- Fibroadenoma with sclerosing adenosis, microcysts, columnar cell changes and usual ductal hyperplasia.  -- Microcalcifications present in microcysts.  -- Previous biopsy site changes.       Objective   Physical Exam  General: Alert and oriented x 3.  Mood and affect are appropriate.  HEENT: EOMI, PERRLA.   Neck: supple, no masses, no cervical adenopathy.  Cardiovascular: no lower extremity edema.  Pulmonary: breathing non labored on room air.  GI: Abdomen soft, no masses. No hepatomegaly or splenomegaly.  Lymph nodes: No supraclavicular or axillary adenopathy bilaterally.  Musculoskeletal: Full range of motion in the upper extremities bilaterally.  Neuro: denies dizziness, tremors    Breasts: The breasts were examined in both the seated and supine positions.    RIGHT: The nipple is everted without nipple discharge.  There are no skin changes, skin thickening, or dimpling. There are no masses palpated in the RIGHT breast.   LEFT: The nipple is everted without nipple discharge.  There are no skin changes, skin thickening, or dimpling. There are no masses palpated in the LEFT breast. Left lateral IMF incision healing well.    Radiology review: All images and reports were personally reviewed.         Beatriz Lewis DO

## 2024-06-11 ENCOUNTER — APPOINTMENT (OUTPATIENT)
Dept: SURGICAL ONCOLOGY | Facility: CLINIC | Age: 59
End: 2024-06-11
Payer: COMMERCIAL

## 2024-07-31 ENCOUNTER — APPOINTMENT (OUTPATIENT)
Dept: PRIMARY CARE | Facility: CLINIC | Age: 59
End: 2024-07-31
Payer: COMMERCIAL

## 2024-07-31 VITALS
BODY MASS INDEX: 23.56 KG/M2 | HEIGHT: 64 IN | OXYGEN SATURATION: 99 % | DIASTOLIC BLOOD PRESSURE: 66 MMHG | RESPIRATION RATE: 18 BRPM | HEART RATE: 77 BPM | SYSTOLIC BLOOD PRESSURE: 122 MMHG | TEMPERATURE: 97.3 F | WEIGHT: 138 LBS

## 2024-07-31 DIAGNOSIS — Z00.00 WELLNESS EXAMINATION: Primary | ICD-10-CM

## 2024-07-31 DIAGNOSIS — F41.9 ANXIETY: ICD-10-CM

## 2024-07-31 DIAGNOSIS — E55.9 VITAMIN D DEFICIENCY: ICD-10-CM

## 2024-07-31 DIAGNOSIS — R53.83 OTHER FATIGUE: ICD-10-CM

## 2024-07-31 PROCEDURE — 99396 PREV VISIT EST AGE 40-64: CPT | Performed by: INTERNAL MEDICINE

## 2024-07-31 PROCEDURE — 3008F BODY MASS INDEX DOCD: CPT | Performed by: INTERNAL MEDICINE

## 2024-07-31 PROCEDURE — 1036F TOBACCO NON-USER: CPT | Performed by: INTERNAL MEDICINE

## 2024-07-31 RX ORDER — ALPRAZOLAM 0.5 MG/1
0.5 TABLET ORAL 3 TIMES DAILY PRN
Qty: 18 TABLET | Refills: 0 | Status: SHIPPED | OUTPATIENT
Start: 2024-07-31 | End: 2024-08-06

## 2024-07-31 NOTE — PROGRESS NOTES
"Subjective   Patient ID: Kaylie Doty is a 59 y.o. female who presents for Annual Exam.    HPI   Mamm 5/2024  Colonoscopy 10/2022 repeat colonoscopy in 2025  1 dose shingrix (2/2020)  No Tdap in chart she declines  Her allergies are  bad   She had atypical ductal hyperplasia so she is going to go on   She is fatigued her allergies are bad. She takes zyrtec every day for the past three months. She has had bad fatigue for the past 3 months.   Has had hysterectomy        Review of Systems   All other systems reviewed and are negative.      Objective   /66 (BP Location: Right arm, Patient Position: Sitting, BP Cuff Size: Adult)   Pulse 77   Temp 36.3 °C (97.3 °F) (Skin)   Resp 18   Ht 1.613 m (5' 3.5\")   Wt 62.6 kg (138 lb)   SpO2 99%   BMI 24.06 kg/m²     Physical Exam  Vitals reviewed.   Constitutional:       General: She is not in acute distress.     Appearance: Normal appearance.   Cardiovascular:      Rate and Rhythm: Normal rate and regular rhythm.      Pulses: Normal pulses.      Heart sounds: Normal heart sounds.   Pulmonary:      Effort: Pulmonary effort is normal.      Breath sounds: Normal breath sounds.   Abdominal:      Tenderness: There is no abdominal tenderness.   Musculoskeletal:         General: No swelling.   Skin:     General: Skin is warm and dry.   Neurological:      Mental Status: She is alert.       Assessment/Plan   Problem List Items Addressed This Visit             ICD-10-CM    Vitamin D deficiency E55.9    Relevant Orders    Vitamin D 25-Hydroxy,Total (for eval of Vitamin D levels)     Other Visit Diagnoses         Codes    Wellness examination    -  Primary Z00.00    Relevant Orders    CBC    Comprehensive Metabolic Panel    Lipid Panel    Other fatigue     R53.83    Relevant Orders    TSH with reflex to Free T4 if abnormal    Magnesium    Iron and TIBC    Anxiety     F41.9    Relevant Medications    ALPRAZolam (Xanax) 0.5 mg tablet             Check labs. Her fatigue is likely " due to either zyrtec or sertrline but I suspect zyrtec.   Dc the med and see if that helps.  Call  with any problems or questions.   Follow up in a year or sooner  if needed  She is going to do high risk cancer screening.

## 2024-08-03 DIAGNOSIS — F41.9 ANXIETY: ICD-10-CM

## 2024-08-05 RX ORDER — SERTRALINE HYDROCHLORIDE 50 MG/1
50 TABLET, FILM COATED ORAL DAILY
Qty: 90 TABLET | Refills: 3 | Status: SHIPPED | OUTPATIENT
Start: 2024-08-05

## 2024-08-30 ENCOUNTER — LAB (OUTPATIENT)
Dept: LAB | Facility: LAB | Age: 59
End: 2024-08-30
Payer: COMMERCIAL

## 2024-08-30 DIAGNOSIS — E55.9 VITAMIN D DEFICIENCY: ICD-10-CM

## 2024-08-30 DIAGNOSIS — Z00.00 WELLNESS EXAMINATION: ICD-10-CM

## 2024-08-30 DIAGNOSIS — R53.83 OTHER FATIGUE: ICD-10-CM

## 2024-08-30 LAB
25(OH)D3 SERPL-MCNC: 29 NG/ML (ref 30–100)
ALBUMIN SERPL BCP-MCNC: 4 G/DL (ref 3.4–5)
ALP SERPL-CCNC: 48 U/L (ref 33–110)
ALT SERPL W P-5'-P-CCNC: 15 U/L (ref 7–45)
ANION GAP SERPL CALC-SCNC: 9 MMOL/L (ref 10–20)
AST SERPL W P-5'-P-CCNC: 15 U/L (ref 9–39)
BILIRUB SERPL-MCNC: 0.4 MG/DL (ref 0–1.2)
BUN SERPL-MCNC: 14 MG/DL (ref 6–23)
CALCIUM SERPL-MCNC: 9.1 MG/DL (ref 8.6–10.3)
CHLORIDE SERPL-SCNC: 104 MMOL/L (ref 98–107)
CHOLEST SERPL-MCNC: 205 MG/DL (ref 0–199)
CHOLESTEROL/HDL RATIO: 2.7
CO2 SERPL-SCNC: 31 MMOL/L (ref 21–32)
CREAT SERPL-MCNC: 0.85 MG/DL (ref 0.5–1.05)
EGFRCR SERPLBLD CKD-EPI 2021: 80 ML/MIN/1.73M*2
ERYTHROCYTE [DISTWIDTH] IN BLOOD BY AUTOMATED COUNT: 11.9 % (ref 11.5–14.5)
GLUCOSE SERPL-MCNC: 81 MG/DL (ref 74–99)
HCT VFR BLD AUTO: 36.6 % (ref 36–46)
HDLC SERPL-MCNC: 76.5 MG/DL
HGB BLD-MCNC: 12 G/DL (ref 12–16)
IRON SATN MFR SERPL: 26 % (ref 25–45)
IRON SERPL-MCNC: 72 UG/DL (ref 35–150)
LDLC SERPL CALC-MCNC: 115 MG/DL
MAGNESIUM SERPL-MCNC: 1.83 MG/DL (ref 1.6–2.4)
MCH RBC QN AUTO: 30.2 PG (ref 26–34)
MCHC RBC AUTO-ENTMCNC: 32.8 G/DL (ref 32–36)
MCV RBC AUTO: 92 FL (ref 80–100)
NON HDL CHOLESTEROL: 129 MG/DL (ref 0–149)
NRBC BLD-RTO: 0 /100 WBCS (ref 0–0)
PLATELET # BLD AUTO: 237 X10*3/UL (ref 150–450)
POTASSIUM SERPL-SCNC: 4.5 MMOL/L (ref 3.5–5.3)
PROT SERPL-MCNC: 6.6 G/DL (ref 6.4–8.2)
RBC # BLD AUTO: 3.97 X10*6/UL (ref 4–5.2)
SODIUM SERPL-SCNC: 139 MMOL/L (ref 136–145)
TIBC SERPL-MCNC: 281 UG/DL (ref 240–445)
TRIGL SERPL-MCNC: 66 MG/DL (ref 0–149)
TSH SERPL-ACNC: 2 MIU/L (ref 0.44–3.98)
UIBC SERPL-MCNC: 209 UG/DL (ref 110–370)
VLDL: 13 MG/DL (ref 0–40)
WBC # BLD AUTO: 5.4 X10*3/UL (ref 4.4–11.3)

## 2024-08-30 PROCEDURE — 83735 ASSAY OF MAGNESIUM: CPT

## 2024-08-30 PROCEDURE — 80053 COMPREHEN METABOLIC PANEL: CPT

## 2024-08-30 PROCEDURE — 83550 IRON BINDING TEST: CPT

## 2024-08-30 PROCEDURE — 84443 ASSAY THYROID STIM HORMONE: CPT

## 2024-08-30 PROCEDURE — 85027 COMPLETE CBC AUTOMATED: CPT

## 2024-08-30 PROCEDURE — 80061 LIPID PANEL: CPT

## 2024-08-30 PROCEDURE — 36415 COLL VENOUS BLD VENIPUNCTURE: CPT

## 2024-08-30 PROCEDURE — 82306 VITAMIN D 25 HYDROXY: CPT

## 2024-08-30 PROCEDURE — 83540 ASSAY OF IRON: CPT

## 2024-09-04 ENCOUNTER — TELEPHONE (OUTPATIENT)
Dept: PRIMARY CARE | Facility: CLINIC | Age: 59
End: 2024-09-04
Payer: COMMERCIAL

## 2024-09-04 DIAGNOSIS — N95.1 SYMPTOMS, SUCH AS FLUSHING, SLEEPLESSNESS, HEADACHE, LACK OF CONCENTRATION, ASSOCIATED WITH THE MENOPAUSE: ICD-10-CM

## 2024-11-20 ENCOUNTER — DOCUMENTATION (OUTPATIENT)
Dept: PHYSICAL THERAPY | Facility: CLINIC | Age: 59
End: 2024-11-20
Payer: COMMERCIAL

## 2024-11-20 NOTE — PROGRESS NOTES
Physical Therapy    Discharge Summary    Name: Kaylie Doty  MRN: [id number]  : 1965  Date: 24    Discharge Summary: PT    Discharge Information: Date of discharge 24

## 2024-12-04 ENCOUNTER — APPOINTMENT (OUTPATIENT)
Dept: RADIOLOGY | Facility: HOSPITAL | Age: 59
End: 2024-12-04
Payer: COMMERCIAL

## 2024-12-11 ENCOUNTER — APPOINTMENT (OUTPATIENT)
Dept: OBSTETRICS AND GYNECOLOGY | Facility: CLINIC | Age: 59
End: 2024-12-11
Payer: COMMERCIAL

## 2024-12-16 ENCOUNTER — APPOINTMENT (OUTPATIENT)
Dept: OBSTETRICS AND GYNECOLOGY | Facility: CLINIC | Age: 59
End: 2024-12-16
Payer: COMMERCIAL

## 2024-12-17 PROBLEM — R92.333 HETEROGENEOUSLY DENSE TISSUE OF BOTH BREASTS ON MAMMOGRAPHY: Status: ACTIVE | Noted: 2024-12-17

## 2024-12-17 PROBLEM — Z12.39 BREAST CANCER SCREENING, HIGH RISK PATIENT: Status: ACTIVE | Noted: 2024-12-17

## 2024-12-17 NOTE — PROGRESS NOTES
[facility] [place]  Kaylie Doty female   1965 59 y.o.   [id number]      Chief Complaint  Follow up annual mammogram and exam, high risk evaluation.    History Of Present Illness  Kaylie Doty is a pleasant 59 y.o.  female s/p left breast excisional biopsy on 2024 for a biopsy proven fibroadenoma. Final pathology  with a 2.5 cm fibroadenoma and associated atypical lobular hyperplasia (ALH) on excision. She has family history of breast cancer in two paternal aunts. She presents today for annual mammogram and high risk evaluation.    REPRODUCTIVE HISTORY:  menarche age 18, , first birth age 31,  x 3 years, OCP's x 9 years, menopause age unknown s/p partial hysterectomy age 49, no HRT, heterogeneously dense    FAMILY CANCER HISTORY:   Paternal Aunt (1): Breast cancer, 70's  Paternal Aunt (2): Breast cancer, 70's    Review of Systems  Constitutional:  Negative for appetite change, fatigue, fever and unexpected weight change.   HENT:  Negative for ear pain, hearing loss, nosebleeds, sore throat and trouble swallowing.    Eyes:  Negative for discharge, itching and visual disturbance.   Breast: As stated in HPI.  Respiratory:  Negative for cough, chest tightness and shortness of breath.    Cardiovascular:  Negative for chest pain, palpitations and leg swelling.   Gastrointestinal:  Negative for abdominal pain, constipation, diarrhea and nausea.   Endocrine: Negative for cold intolerance and heat intolerance.   Genitourinary:  Negative for dysuria, frequency, hematuria, pelvic pain and vaginal bleeding.   Musculoskeletal:  Negative for arthralgias, back pain, gait problem, joint swelling and myalgias.   Skin:  Negative for color change and rash.   Allergic/Immunologic: Negative for environmental allergies and food allergies.   Neurological:  Negative for dizziness, tremors, speech difficulty, weakness, numbness and headaches.   Hematological:  Does not bruise/bleed easily.    Psychiatric/Behavioral:  Negative for agitation, dysphoric mood and sleep disturbance. The patient is not nervous/anxious.       Past Medical History  She has a past medical history of Abnormal uterine and vaginal bleeding, unspecified (06/03/2014), Allergic (1/1/1980), Benign paroxysmal vertigo, unspecified ear (01/16/2021), Encounter for screening mammogram for malignant neoplasm of breast, Other conditions influencing health status, Other conditions influencing health status (06/03/2014), Other conditions influencing health status (10/01/2013), Personal history of diseases of the skin and subcutaneous tissue, Personal history of other diseases of urinary system (11/16/2015), Personal history of other specified conditions (03/12/2019), Stress incontinence (female) (male) (03/18/2019), and Unspecified injury of unspecified elbow, initial encounter.    Surgical History  She has a past surgical history that includes Tonsillectomy (12/20/2012); Dilation and curettage of uterus (12/20/2012); Colonoscopy (12/21/2012); Varicose vein surgery (12/21/2012); Other surgical history (12/21/2012); Other surgical history (05/18/2020); Other surgical history (04/02/2019); Hysterectomy (09/30/2014); [place] tooth extraction (6/7/1984); and Breast biopsy.     Family History  Cancer-related family history includes Breast cancer in her father's sister.     Social History  She reports that she has never smoked. She has never used smokeless tobacco. She reports that she does not currently use alcohol. She reports that she does not use drugs.    Allergies  Morphine    Medications  Current Outpatient Medications   Medication Instructions    ALPRAZolam (XANAX) 0.5 mg, oral, 3 times daily PRN    magnesium 30 mg, oral, 2 times daily    meclizine (ANTIVERT) 25 mg, oral, 3 times daily PRN    ondansetron (ZOFRAN) 4 mg, oral, Every 6 hours PRN    sertraline (ZOLOFT) 50 mg, oral, Daily       Last Recorded Vitals  Vitals:    12/19/24 0742   BP:  134/72   Pulse: 71   Resp: 16       Physical Exam  Chest:         Patient is alert and oriented x3 and in a relaxed and appropriate mood. Her gait is steady and hand grasps are equal. Sclera is clear. The breasts are nearly symmetrical. Bilateral breasts have well healed excisional biopsy incisions. The tissue is soft without palpable abnormalities, discrete nodules or masses. The skin and nipples appear normal. There is no cervical, supraclavicular or axillary lymphadenopathy.        Relevant Results and Imaging  Pending      Risk Models            Orders  Orders Placed This Encounter   Procedures    BI mammo bilateral screening tomosynthesis     Standing Status:   Future     Standing Expiration Date:   1/19/2026     Order Specific Question:   Is the patient pregnant?     Answer:   No     Order Specific Question:   Reason for exam:     Answer:   annual screening mammogram     Order Specific Question:   Radiologist to Determine Optimal Study     Answer:   Yes     Order Specific Question:   [facility]     Answer:   Immediate     Order Specific Question:   Is this exam part of a Research Study? If Yes, link this order to the research study     Answer:   No    MR breast bilateral w contrast full protocol     Standing Status:   Future     Standing Expiration Date:   2/19/2026     Order Specific Question:   Has the Patient had a prior MRI with contrast and had an allergic reaction?     Answer:   No     Order Specific Question:   Reason for exam:     Answer:   high risk surveillance care, heterogeneously dense breast tissue, lifetime risk > 20%, history atypical lobular hyperplasia     Order Specific Question:   Is the patient pregnant or breast feeding?     Answer:   No     Order Specific Question:   What is the patient's sedation requirement?     Answer:   No Sedation     Order Specific Question:   Does the patient have a Cochlear Implant, Pacemaker, Defibrillator, Pacing Wire, Brain Aneurysm Clip,  Implanted Nerve or Bone Graft Stimulator, Implanted Breast Tissue Expander, Glucose Monitor or Neulasta Device?     Answer:   No     Order Specific Question:   Radiologist to Determine Optimal Study     Answer:   Yes     Order Specific Question:   Release result to [employer]     Answer:   Immediate     Order Specific Question:   Is this exam part of a Research Study? If Yes, link this order to the research study     Answer:   No       Visit Diagnosis  1. Heterogeneously dense tissue of both breasts on mammography  MR breast bilateral w contrast full protocol      2. Breast cancer screening, high risk patient  Clinic Appointment Request Follow Up    BI mammo bilateral screening tomosynthesis    MR breast bilateral w contrast full protocol      3. Flat epithelial atypia of right breast        4. Atypical lobular hyperplasia (ALH) of left breast  MR breast bilateral w contrast full protocol          Assessment/Plan  High risk surveillance care, normal clinical exam, hx left excisional biopsy with fibroadenoma and ALH, family history breast cancer, heterogeneously dense    Plan:  Return December 2025 for bilateral screening mammogram and office visit. Proceed with full breast MRI June 2025. Discussed risk/side effects of endocrine therapy and recommended. Declines endocrine therapy at this time.    Patient Discussion/Summary  Your clinical examination is normal. You had a screening mammogram today and the results are pending. I will inform you if the screening mammogram is abnormal. Please return in one year for a screening mammogram and office visit or sooner if you have any questions or concerns.     High risk breast surveillance care plan:  Yearly mammogram with digital breast tomosynthesis  Twice yearly clinical breast examinations  Breast MRI (to schedule call [phone])  Monthly self breast examinations &/or regular self breast awareness  Vitamin D3 2000 IU/daily (over the counter) unless your PCP recommends you  take a specific dose  Exercise 3-4 times per week for 45-60 minutes  Limit alcohol to 3-4 drinks per week if you are menopausal  Eat healthy low-fat diet with lots of vegetable & fruits  Risk model indicates you are eligible for endocrine therapy with Tamoxifen, Raloxifene or Aromatase inhibitor. Endocrine therapy reduces lifetime risk of breast cancer by up to 50% when taken for 5 years. There is an alternative low dose Tamoxifen which can be taken for only 3 years.      IMPORTANT INFORMATION REGARDING YOUR RESULTS    You can see your health information, review clinical summaries from office visits & test results online when you follow your health with MY  Chart, a personal health record. To sign up go to [email or web address]. If you need assistance with signing up or trouble getting into your account call [employer] Patient Line 24/7 at [phone].    My office phone number is [phone] if you need to get in touch with me or have additional questions or concerns. Thank you for choosing [facility] and trusting me as your healthcare provider. I look forward to seeing you again at your next office visit. I am honored to be a provider on your health care team and I remain dedicated to helping you achieve your health goals.    Amy Osullivan, APRN-CNP

## 2024-12-19 ENCOUNTER — HOSPITAL ENCOUNTER (OUTPATIENT)
Dept: RADIOLOGY | Facility: HOSPITAL | Age: 59
Discharge: HOME | End: 2024-12-19
Payer: COMMERCIAL

## 2024-12-19 ENCOUNTER — OFFICE VISIT (OUTPATIENT)
Dept: SURGICAL ONCOLOGY | Facility: HOSPITAL | Age: 59
End: 2024-12-19
Payer: COMMERCIAL

## 2024-12-19 VITALS
HEART RATE: 71 BPM | DIASTOLIC BLOOD PRESSURE: 72 MMHG | RESPIRATION RATE: 16 BRPM | HEIGHT: 64 IN | WEIGHT: 135 LBS | SYSTOLIC BLOOD PRESSURE: 134 MMHG | BODY MASS INDEX: 23.05 KG/M2

## 2024-12-19 DIAGNOSIS — R92.333 HETEROGENEOUSLY DENSE TISSUE OF BOTH BREASTS ON MAMMOGRAPHY: Primary | ICD-10-CM

## 2024-12-19 DIAGNOSIS — Z12.31 ENCOUNTER FOR SCREENING MAMMOGRAM FOR HIGH-RISK PATIENT: ICD-10-CM

## 2024-12-19 DIAGNOSIS — N60.92 ATYPICAL LOBULAR HYPERPLASIA (ALH) OF LEFT BREAST: ICD-10-CM

## 2024-12-19 DIAGNOSIS — N60.81 FLAT EPITHELIAL ATYPIA OF RIGHT BREAST: ICD-10-CM

## 2024-12-19 DIAGNOSIS — Z12.39 BREAST CANCER SCREENING, HIGH RISK PATIENT: ICD-10-CM

## 2024-12-19 PROCEDURE — 3008F BODY MASS INDEX DOCD: CPT | Performed by: NURSE PRACTITIONER

## 2024-12-19 PROCEDURE — 77067 SCR MAMMO BI INCL CAD: CPT

## 2024-12-19 PROCEDURE — 99214 OFFICE O/P EST MOD 30 MIN: CPT | Performed by: NURSE PRACTITIONER

## 2024-12-19 PROCEDURE — 1036F TOBACCO NON-USER: CPT | Performed by: NURSE PRACTITIONER

## 2024-12-31 ENCOUNTER — APPOINTMENT (OUTPATIENT)
Dept: OBSTETRICS AND GYNECOLOGY | Facility: CLINIC | Age: 59
End: 2024-12-31
Payer: COMMERCIAL

## 2024-12-31 VITALS
HEIGHT: 64 IN | BODY MASS INDEX: 23.9 KG/M2 | WEIGHT: 140 LBS | DIASTOLIC BLOOD PRESSURE: 68 MMHG | SYSTOLIC BLOOD PRESSURE: 112 MMHG

## 2024-12-31 DIAGNOSIS — K59.09 OTHER CONSTIPATION: ICD-10-CM

## 2024-12-31 DIAGNOSIS — Z87.448 HISTORY OF PROLAPSE OF BLADDER: Primary | ICD-10-CM

## 2024-12-31 DIAGNOSIS — N95.1 VASOMOTOR SYMPTOMS DUE TO MENOPAUSE: ICD-10-CM

## 2024-12-31 PROCEDURE — 1036F TOBACCO NON-USER: CPT | Performed by: STUDENT IN AN ORGANIZED HEALTH CARE EDUCATION/TRAINING PROGRAM

## 2024-12-31 PROCEDURE — 99214 OFFICE O/P EST MOD 30 MIN: CPT | Performed by: STUDENT IN AN ORGANIZED HEALTH CARE EDUCATION/TRAINING PROGRAM

## 2024-12-31 PROCEDURE — 3008F BODY MASS INDEX DOCD: CPT | Performed by: STUDENT IN AN ORGANIZED HEALTH CARE EDUCATION/TRAINING PROGRAM

## 2024-12-31 ASSESSMENT — PAIN SCALES - GENERAL: PAINLEVEL_OUTOF10: 0-NO PAIN

## 2024-12-31 NOTE — PROGRESS NOTES
"Annual exam     Chaperone declined: PATRICIA Cid      HISTORY OF PRESENT ILLNESS:  Kaylie Doty is a 59 y.o. female, who presents in follow up for postop, s/p SCP, TVT with Dr. Glenna Anne     During last encounter on 12/11/23, reviewed and agreed to the following:  Kaylie Doty is a 58 y.o. who presents in follow up for post op (SCP, TVT with Dr. Glenna Anne).     Post op  - doing well  - no e/o mesh exposure     Vaginal atrophy  - present on exam  - discussed routes of estrogen, amenable to estrogen cream  - rx sent today to preferred pharmacy, advised on instructions for use     All questions and concerns were answered and addressed.  The patient expressed understanding and agrees with the plan.      Return for annual exam    Today she reports   Tried switching to estring. Had breast lump removed. Stopped using estrogen altogether. Has been using lubricants for intercourse which has worked well.    Does report some menopausal symptoms: mood changes, anxiety (although there are several life events occurring simultaneously as well, father is terminally ill, daughter getting , building a new house). Would like to avoid hormone therapy if possible, interested in a more holistic approach if that would be reasonable.    Constipation has been an issue. Tried metamucil and benefiber with some benefit but she feels she may have had a reaction to the sugar free formulation    The following were reviewed to gain additional history:  External notes: Dr. Neymar Beebe, annual exam 7/31/24  Test results: Cr 0.85 8/30/24, breast biopsy with atypical lobular hyperplasia, fibroadenoma, microcalcifications          PHYSICAL EXAMINATION:  No LMP recorded. Patient has had a hysterectomy.  Body mass index is 24.03 kg/m².  /68   Ht 1.626 m (5' 4\")   Wt 63.5 kg (140 lb)   BMI 24.03 kg/m²     General Appearance: well appearing  Neuro: Alert and oriented   HEENT: mucous membranes moist, neck supple  Resp: No " respiratory distress, normal work of breathing  MSK: normal range of motion, gait appropriate    Pelvic:  Chaperone for pelvic exam:   Genitourinary: normal external genitalia, Bartholin's glands, [place]'s glands negative,   Urethra normal meatus, non-tender, no periurethral mass  Vaginal mucosa  normal  Cervix absent  Uterus absent  Adnexae  negative nontender, no masses  Atrophy negative    CST negative    POP-Q (in supine position):  No significant prolapse    Rectal: no hemorrhoids, fissures or masses.    PVR (by ultrasound): deferred      IMPRESSION AND PLAN:  Kaylie Doty is a 59 y.o. who presents in follow up for post-op (SCP, TVT with Dr. Manzanares 2017).    Annual mesh check  - unremarkable exam today  - has been stable for many years; offered to defer further annual exams and she is amenable  - reviewed she is welcome to return if any pelvic floor concerns at all    Menopause symptoms  - offered referral to Dr. Soliman  - she would like to schedule; will schedule prior to leaving visit today    Constipation  - has tried metamucil powder and benefiber as above with some improvement  - reviewed there are other fiber formulations available she could try  - fiber handout given today    RTC as needed    All questions and concerns were answered and addressed.  The patient expressed understanding and agrees with the plan.

## 2025-01-06 ENCOUNTER — APPOINTMENT (OUTPATIENT)
Dept: OBSTETRICS AND GYNECOLOGY | Facility: CLINIC | Age: 60
End: 2025-01-06
Payer: COMMERCIAL

## 2025-02-04 DIAGNOSIS — K59.04 CHRONIC IDIOPATHIC CONSTIPATION: Primary | ICD-10-CM

## 2025-02-07 DIAGNOSIS — K59.04 CHRONIC IDIOPATHIC CONSTIPATION: Primary | ICD-10-CM

## 2025-02-07 RX ORDER — LUBIPROSTONE 8 UG/1
CAPSULE ORAL
Qty: 60 CAPSULE | Refills: 5 | Status: SHIPPED | OUTPATIENT
Start: 2025-02-07

## 2025-02-13 ENCOUNTER — TELEPHONE (OUTPATIENT)
Dept: PRIMARY CARE | Facility: CLINIC | Age: 60
End: 2025-02-13
Payer: COMMERCIAL

## 2025-02-26 ENCOUNTER — APPOINTMENT (OUTPATIENT)
Dept: PRIMARY CARE | Facility: CLINIC | Age: 60
End: 2025-02-26
Payer: COMMERCIAL

## 2025-02-26 VITALS
DIASTOLIC BLOOD PRESSURE: 78 MMHG | RESPIRATION RATE: 14 BRPM | TEMPERATURE: 97.6 F | HEIGHT: 64 IN | BODY MASS INDEX: 23.73 KG/M2 | HEART RATE: 86 BPM | SYSTOLIC BLOOD PRESSURE: 118 MMHG | OXYGEN SATURATION: 99 % | WEIGHT: 139 LBS

## 2025-02-26 DIAGNOSIS — K59.09 OTHER CONSTIPATION: ICD-10-CM

## 2025-02-26 DIAGNOSIS — F41.9 ANXIETY DISORDER, UNSPECIFIED TYPE: Primary | ICD-10-CM

## 2025-02-26 PROBLEM — N63.20 LEFT BREAST MASS: Status: RESOLVED | Noted: 2023-04-11 | Resolved: 2025-02-26

## 2025-02-26 PROBLEM — M53.3 SACRAL BACK PAIN: Status: RESOLVED | Noted: 2023-04-11 | Resolved: 2025-02-26

## 2025-02-26 PROBLEM — S46.911S SHOULDER STRAIN, RIGHT, SEQUELA: Status: RESOLVED | Noted: 2023-04-11 | Resolved: 2025-02-26

## 2025-02-26 PROBLEM — S76.011S: Status: RESOLVED | Noted: 2023-04-11 | Resolved: 2025-02-26

## 2025-02-26 PROBLEM — S16.1XXA NECK STRAIN, INITIAL ENCOUNTER: Status: RESOLVED | Noted: 2023-04-11 | Resolved: 2025-02-26

## 2025-02-26 PROBLEM — M25.551 HIP PAIN, RIGHT: Status: RESOLVED | Noted: 2023-04-11 | Resolved: 2025-02-26

## 2025-02-26 PROBLEM — R11.2 PONV (POSTOPERATIVE NAUSEA AND VOMITING): Status: RESOLVED | Noted: 2024-05-24 | Resolved: 2025-02-26

## 2025-02-26 PROBLEM — M79.9 POSTURAL STRAIN: Status: RESOLVED | Noted: 2023-04-11 | Resolved: 2025-02-26

## 2025-02-26 PROBLEM — Z98.890 PONV (POSTOPERATIVE NAUSEA AND VOMITING): Status: RESOLVED | Noted: 2024-05-24 | Resolved: 2025-02-26

## 2025-02-26 PROBLEM — H81.10 BPPV (BENIGN PAROXYSMAL POSITIONAL VERTIGO): Status: RESOLVED | Noted: 2023-04-11 | Resolved: 2025-02-26

## 2025-02-26 PROCEDURE — 99213 OFFICE O/P EST LOW 20 MIN: CPT | Performed by: INTERNAL MEDICINE

## 2025-02-26 PROCEDURE — 3008F BODY MASS INDEX DOCD: CPT | Performed by: INTERNAL MEDICINE

## 2025-02-26 PROCEDURE — 1036F TOBACCO NON-USER: CPT | Performed by: INTERNAL MEDICINE

## 2025-02-26 ASSESSMENT — PATIENT HEALTH QUESTIONNAIRE - PHQ9
1. LITTLE INTEREST OR PLEASURE IN DOING THINGS: NOT AT ALL
SUM OF ALL RESPONSES TO PHQ9 QUESTIONS 1 AND 2: 0
2. FEELING DOWN, DEPRESSED OR HOPELESS: NOT AT ALL

## 2025-02-26 NOTE — PROGRESS NOTES
"Subjective    Kaylie Doty is a 60 y.o. female who presents for Follow-up.  HPI      Follow up  Amitiza - no changes  D/c'ed Sertraline. Felt more depressed.     C/o hot flashes since stopping the sertraline.   Not sleeping      Scheduled with derm tomorrow. Basal cell removal  She is going to [facility].     Review of Systems   All other systems reviewed and are negative.        Objective     /78 (BP Location: Left arm, Patient Position: Sitting, BP Cuff Size: Adult)   Pulse 86   Temp 36.4 °C (97.6 °F) (Skin)   Resp 14   Ht 1.626 m (5' 4\")   Wt 63 kg (139 lb)   SpO2 99%   BMI 23.86 kg/m²    Physical Exam  Vitals reviewed.   Constitutional:       General: She is not in acute distress.     Appearance: Normal appearance.   Cardiovascular:      Rate and Rhythm: Normal rate and regular rhythm.      Pulses: Normal pulses.      Heart sounds: Normal heart sounds.   Pulmonary:      Effort: Pulmonary effort is normal.      Breath sounds: Normal breath sounds.   Abdominal:      Tenderness: There is no abdominal tenderness.   Musculoskeletal:         General: No swelling.   Skin:     General: Skin is warm and dry.   Neurological:      Mental Status: She is alert.       Health Maintenance Due   Topic Date Due    HIV Screening  Never done    MMR Vaccines (1 of 1 - Standard series) Never done    Hepatitis B Vaccines (1 of 3 - 19+ 3-dose series) Never done    DTaP/Tdap/Td Vaccines (1 - Tdap) Never done    Pneumococcal Vaccine (1 of 1 - PCV) Never done    Influenza Vaccine (1) 09/01/2024    COVID-19 Vaccine (4 - 2024-25 season) 09/01/2024          Assessment/Plan   Problem List Items Addressed This Visit       Anxiety disorder - Primary    Constipation   Restart her setraline at 25.   Her amitiza did not help with her constipation.   Recommend she follow up with her gastro;   Call  with any problems or questions.   Follow up in 6 months.   "

## 2025-03-05 ENCOUNTER — APPOINTMENT (OUTPATIENT)
Dept: OBSTETRICS AND GYNECOLOGY | Facility: CLINIC | Age: 60
End: 2025-03-05
Payer: COMMERCIAL

## 2025-03-05 VITALS
HEIGHT: 64 IN | WEIGHT: 137 LBS | SYSTOLIC BLOOD PRESSURE: 108 MMHG | DIASTOLIC BLOOD PRESSURE: 76 MMHG | BODY MASS INDEX: 23.39 KG/M2

## 2025-03-05 DIAGNOSIS — F41.9 ANXIETY: ICD-10-CM

## 2025-03-05 DIAGNOSIS — N95.1 VAGINAL DRYNESS, MENOPAUSAL: Primary | ICD-10-CM

## 2025-03-05 PROCEDURE — 1036F TOBACCO NON-USER: CPT | Performed by: OBSTETRICS & GYNECOLOGY

## 2025-03-05 PROCEDURE — 99214 OFFICE O/P EST MOD 30 MIN: CPT | Performed by: OBSTETRICS & GYNECOLOGY

## 2025-03-05 PROCEDURE — 3008F BODY MASS INDEX DOCD: CPT | Performed by: OBSTETRICS & GYNECOLOGY

## 2025-03-05 RX ORDER — ESTRADIOL 0.1 MG/G
CREAM VAGINAL
Qty: 42.5 G | Refills: 3 | Status: SHIPPED | OUTPATIENT
Start: 2025-03-05

## 2025-03-05 RX ORDER — DULOXETIN HYDROCHLORIDE 20 MG/1
20 CAPSULE, DELAYED RELEASE ORAL DAILY
Qty: 90 CAPSULE | Refills: 3 | Status: SHIPPED | OUTPATIENT
Start: 2025-03-05 | End: 2025-03-05 | Stop reason: SDUPTHER

## 2025-03-05 RX ORDER — DULOXETIN HYDROCHLORIDE 20 MG/1
20 CAPSULE, DELAYED RELEASE ORAL DAILY
Qty: 90 CAPSULE | Refills: 3 | Status: SHIPPED | OUTPATIENT
Start: 2025-03-05 | End: 2026-03-05

## 2025-03-05 RX ORDER — ESTRADIOL 0.1 MG/G
CREAM VAGINAL
Qty: 42.5 G | Refills: 3 | Status: SHIPPED | OUTPATIENT
Start: 2025-03-05 | End: 2025-03-05 | Stop reason: SDUPTHER

## 2025-03-05 ASSESSMENT — PAIN SCALES - GENERAL: PAINLEVEL_OUTOF10: 3

## 2025-03-05 NOTE — PROGRESS NOTES
Menopausal consult. Sx includes hot flashes, mood changes and wt gain     Chaperone declined: Reny Auguste, CM3      Kaylie Doty is a 60 y.o. here for menopause consult from Kaylie Beebe DO     HPI: Does report some menopausal symptoms: mood changes, anxiety (although there are several life events occurring simultaneously as well, father is terminally ill, daughter getting , building a new house). Would like to avoid hormone therapy if possible, interested in a more holistic approach if that would be reasonable.     Pt's primary concerns are lack of libido and anxiety. Also has brain fog, having difficulty sleeping-mostly wakes up in middle of night and can't get back to sleep, hot flashes started last year. Sexually active with , sometimes hurts, uses lube which helps with pain. Reports being more constipated, mirlax and amitiza didn't help. Took sertraline for a while and didn't feel like herself, couldn't get out of bed (used in Aug -Jan). Very anxious about things, overthinking. PHQ-2 neg, denies SI/HI.    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 Drinks alcohol ~2 times per year  Exercise: 3-4x per week  Past med hx and past surg hx reviewed and notable for: breast lumpectomy Aypical lobular hyperplasia,      Past Medical History:   Diagnosis Date    Allergic 1/1/1980    Atypical lobular hyperplasia (ALH) of right breast     Benign paroxysmal vertigo, unspecified ear 01/16/2021    BPPV (benign paroxysmal positional vertigo)    Other conditions influencing health status     Malignant Melanoma Of The Skin    Other conditions influencing health status 06/03/2014    History of dyspareunia    Other conditions influencing health status 10/01/2013    Toxicity From Rifampin    Personal history of diseases of the skin and subcutaneous tissue     History of  "pilonidal cyst    Personal history of other diseases of urinary system 11/16/2015    History of cystocele    Personal history of other specified conditions 03/12/2019    History of lump of right breast    Stress incontinence (female) (male) 03/18/2019    Female genuine stress incontinence    Unspecified injury of unspecified elbow, initial encounter     Elbow injury      Social History     Substance and Sexual Activity   Sexual Activity Yes    Partners: Male    Birth control/protection: Post-menopausal, Male Sterilization         Objective   /76   Ht 1.626 m (5' 4\")   Wt 62.1 kg (137 lb)   BMI 23.52 kg/m²     Physical Exam  Constitutional:       Appearance: Normal appearance.   Neurological:      Mental Status: She is alert.   Psychiatric:         Mood and Affect: Mood normal.         Behavior: Behavior normal.          Assessment and Plan:    Problem List Items Addressed This Visit    None  Visit Diagnoses       Vaginal dryness, menopausal    -  Primary    Relevant Medications    estradiol (Estrace) 0.01 % (0.1 mg/gram) vaginal cream    Anxiety        Relevant Medications    DULoxetine (Cymbalta) 20 mg DR capsule           No orders of the defined types were placed in this encounter.      After discussion with pt decided on starting duloxetine and not hormonal therapy given h/o atypical cells on breast biopsy. Also sending vaginal estrogen.  RTC in 3 months      Patient seen and staffed with attending physician, .  Cristina Matos MD  Internal Medicine, PGY-2    "

## 2025-04-17 DIAGNOSIS — Z12.39 BREAST CANCER SCREENING, HIGH RISK PATIENT: Primary | ICD-10-CM

## 2025-05-05 ENCOUNTER — OFFICE VISIT (OUTPATIENT)
Dept: PRIMARY CARE | Facility: CLINIC | Age: 60
End: 2025-05-05
Payer: COMMERCIAL

## 2025-05-05 VITALS
WEIGHT: 143 LBS | DIASTOLIC BLOOD PRESSURE: 80 MMHG | OXYGEN SATURATION: 98 % | BODY MASS INDEX: 24.55 KG/M2 | HEART RATE: 78 BPM | SYSTOLIC BLOOD PRESSURE: 124 MMHG | RESPIRATION RATE: 16 BRPM | TEMPERATURE: 98 F

## 2025-05-05 DIAGNOSIS — S93.401A SPRAIN OF RIGHT ANKLE, UNSPECIFIED LIGAMENT, INITIAL ENCOUNTER: Primary | ICD-10-CM

## 2025-05-05 PROCEDURE — 99214 OFFICE O/P EST MOD 30 MIN: CPT | Performed by: FAMILY MEDICINE

## 2025-05-05 PROCEDURE — 1036F TOBACCO NON-USER: CPT | Performed by: FAMILY MEDICINE

## 2025-05-05 ASSESSMENT — ENCOUNTER SYMPTOMS
WEAKNESS: 0
NUMBNESS: 0
NAUSEA: 0
ARTHRALGIAS: 1
FEVER: 0
WOUND: 0
DIFFICULTY URINATING: 0
RESPIRATORY NEGATIVE: 1
VOMITING: 0

## 2025-05-05 NOTE — ASSESSMENT & PLAN NOTE
Pt was told by GI not to take po nsaids  Pt may try otc  voltaren gel topically 3 x/d prn  R I C E , no exercise LE's ,may get ankle brace, neoprene and velcro from [facility]  Get xr done today  If neg xr may start PT  F/up with pcp if no improvement

## 2025-05-05 NOTE — PROGRESS NOTES
Subjective   Patient ID: Kaylie Doty is a 60 y.o. female who presents for Ankle Injury.    Ankle Injury   Incident onset: 4/19/2025. Injury mechanism: Missed a step walking down the stairs. The pain is present in the right ankle. The pain is at a severity of 3/10. Pertinent negatives include no numbness.        Review of Systems   Constitutional:  Negative for fever.        4/19/25 pt was walking down steps and twisted R ankle     HENT: Negative.     Respiratory: Negative.     Gastrointestinal:  Negative for nausea and vomiting.   Genitourinary:  Negative for difficulty urinating.   Musculoskeletal:  Positive for arthralgias.        Pt twisted r ankle, injured when missed a step and inverted ankle  Pt did RICE x 3-4 d, started to feel better and went to yoga yest and now pain has started again  Took aleve  Pain is now posterior heel  Pain is pulling, intermittent, worse with walking,  improved with rice  No numbness tingling or weakness.  Pt with swelling lat maleolus, no redness or bruising.   Skin:  Negative for wound.   Neurological:  Negative for weakness and numbness.       Objective   /80   Pulse 78   Temp 36.7 °C (98 °F)   Resp 16   Wt 64.9 kg (143 lb)   SpO2 98%   BMI 24.55 kg/m²     Physical Exam  Vitals and nursing note reviewed.   Constitutional:       General: She is not in acute distress.     Appearance: Normal appearance.   HENT:      Head: Normocephalic and atraumatic.   Cardiovascular:      Rate and Rhythm: Normal rate and regular rhythm.      Heart sounds: Normal heart sounds.   Pulmonary:      Effort: Pulmonary effort is normal.      Breath sounds: Normal breath sounds.   Musculoskeletal:         General: Swelling and tenderness present.      Comments: R foot/ankle with mild swelling lateral maleolus , no erythema or ecchymosis  Tenderness to palpation of achilles tendon  FROM, neurovasc intact   Skin:     General: Skin is warm and dry.      Findings: No bruising.   Neurological:       General: No focal deficit present.      Mental Status: She is alert.      Sensory: No sensory deficit.      Motor: No weakness.      Gait: Gait normal.         Assessment/Plan   Problem List Items Addressed This Visit           ICD-10-CM    Sprain of right ankle - Primary S93.401A    Pt was told by GI not to take po nsaids  Pt may try otc  voltaren gel topically 3 x/d prn  R I C E , no exercise LE's ,may get ankle brace, neoprene and velcro from [facility]  Get xr done today  If neg xr may start PT  F/up with pcp if no improvement         Relevant Orders    Referral to Physical Therapy    XR ankle right 3+ views    XR foot right 3+ views           unhealed DTI in L heel PO <25%

## 2025-05-06 ENCOUNTER — HOSPITAL ENCOUNTER (OUTPATIENT)
Dept: RADIOLOGY | Facility: CLINIC | Age: 60
Discharge: HOME | End: 2025-05-06
Payer: COMMERCIAL

## 2025-05-06 DIAGNOSIS — S93.401A SPRAIN OF RIGHT ANKLE, UNSPECIFIED LIGAMENT, INITIAL ENCOUNTER: ICD-10-CM

## 2025-05-06 PROCEDURE — 73610 X-RAY EXAM OF ANKLE: CPT | Mod: RT

## 2025-05-06 PROCEDURE — 73610 X-RAY EXAM OF ANKLE: CPT | Mod: RIGHT SIDE | Performed by: STUDENT IN AN ORGANIZED HEALTH CARE EDUCATION/TRAINING PROGRAM

## 2025-05-06 PROCEDURE — 73630 X-RAY EXAM OF FOOT: CPT | Mod: RIGHT SIDE | Performed by: STUDENT IN AN ORGANIZED HEALTH CARE EDUCATION/TRAINING PROGRAM

## 2025-05-06 PROCEDURE — 73630 X-RAY EXAM OF FOOT: CPT | Mod: RT

## 2025-06-11 ENCOUNTER — APPOINTMENT (OUTPATIENT)
Dept: OBSTETRICS AND GYNECOLOGY | Facility: CLINIC | Age: 60
End: 2025-06-11
Payer: COMMERCIAL

## 2025-07-01 ENCOUNTER — APPOINTMENT (OUTPATIENT)
Dept: RADIOLOGY | Facility: HOSPITAL | Age: 60
End: 2025-07-01
Payer: COMMERCIAL

## 2025-07-01 DIAGNOSIS — Z12.39 BREAST CANCER SCREENING, HIGH RISK PATIENT: ICD-10-CM

## 2025-07-01 PROCEDURE — 6100000003 BI MR BREAST BILATERAL WITH CONTRAST FAST SCREENING SELF PAY

## 2025-07-01 PROCEDURE — 2550000001 HC RX 255 CONTRASTS: Performed by: NURSE PRACTITIONER

## 2025-07-01 PROCEDURE — A9575 INJ GADOTERATE MEGLUMI 0.1ML: HCPCS | Performed by: NURSE PRACTITIONER

## 2025-07-01 RX ORDER — GADOTERATE MEGLUMINE 376.9 MG/ML
13 INJECTION INTRAVENOUS
Status: COMPLETED | OUTPATIENT
Start: 2025-07-01 | End: 2025-07-01

## 2025-07-01 RX ADMIN — GADOTERATE MEGLUMINE 13 ML: 376.9 INJECTION INTRAVENOUS at 17:06

## 2025-07-09 ENCOUNTER — OFFICE VISIT (OUTPATIENT)
Dept: ORTHOPEDIC SURGERY | Facility: CLINIC | Age: 60
End: 2025-07-09
Payer: COMMERCIAL

## 2025-07-09 ENCOUNTER — HOSPITAL ENCOUNTER (OUTPATIENT)
Dept: RADIOLOGY | Facility: CLINIC | Age: 60
Discharge: HOME | End: 2025-07-09
Payer: COMMERCIAL

## 2025-07-09 DIAGNOSIS — M25.511 ACUTE PAIN OF RIGHT SHOULDER: ICD-10-CM

## 2025-07-09 DIAGNOSIS — M75.81 ROTATOR CUFF TENDONITIS, RIGHT: Primary | ICD-10-CM

## 2025-07-09 PROCEDURE — 20610 DRAIN/INJ JOINT/BURSA W/O US: CPT | Mod: RT | Performed by: PHYSICIAN ASSISTANT

## 2025-07-09 PROCEDURE — 73030 X-RAY EXAM OF SHOULDER: CPT | Mod: RIGHT SIDE | Performed by: STUDENT IN AN ORGANIZED HEALTH CARE EDUCATION/TRAINING PROGRAM

## 2025-07-09 PROCEDURE — 1036F TOBACCO NON-USER: CPT | Performed by: PHYSICIAN ASSISTANT

## 2025-07-09 PROCEDURE — 73030 X-RAY EXAM OF SHOULDER: CPT | Mod: RT

## 2025-07-09 PROCEDURE — 99203 OFFICE O/P NEW LOW 30 MIN: CPT | Performed by: PHYSICIAN ASSISTANT

## 2025-07-09 PROCEDURE — 2500000004 HC RX 250 GENERAL PHARMACY W/ HCPCS (ALT 636 FOR OP/ED): Performed by: PHYSICIAN ASSISTANT

## 2025-07-09 PROCEDURE — 99212 OFFICE O/P EST SF 10 MIN: CPT | Mod: 25 | Performed by: PHYSICIAN ASSISTANT

## 2025-07-09 RX ORDER — TRIAMCINOLONE ACETONIDE 40 MG/ML
40 INJECTION, SUSPENSION INTRA-ARTICULAR; INTRAMUSCULAR
Status: COMPLETED | OUTPATIENT
Start: 2025-07-09 | End: 2025-07-09

## 2025-07-09 RX ADMIN — TRIAMCINOLONE ACETONIDE 40 MG: 400 INJECTION, SUSPENSION INTRA-ARTICULAR; INTRAMUSCULAR at 09:55

## 2025-07-09 ASSESSMENT — PAIN - FUNCTIONAL ASSESSMENT: PAIN_FUNCTIONAL_ASSESSMENT: 0-10

## 2025-07-09 ASSESSMENT — PAIN SCALES - GENERAL: PAINLEVEL_OUTOF10: 2

## 2025-07-09 NOTE — PROGRESS NOTES
Subjective    Patient ID: Kaylie Doty is a 60 y.o. female.    Chief Complaint: Pain of the Right Shoulder           HPI:  Kaylie Doty is a 60 y.o. female who presents today for evaluation of her right shoulder.  For the last several months she has been experiencing pain throughout her right shoulder and right upper extremity.  There is no specific injury or trauma that she could recall.  She believes it may have been aggravated while doing her upper body workouts.  She will note some occasional pain that is around her scapula with some occasional pain radiating down her right arm with some numbness and tingling.  She states that certain motions bother her such as reaching behind her back or to the backseat of the car.  She has been instructed in the past to avoid anti-inflammatory medications due to  stomach issues but has used some Advil from time to time which does help.  She has been participating physical therapy for the last several weeks.    ROS  Constitutional: No fever, no chills, not feeling tired, no recent weight gain and no recent weight loss  ENT: No nosebleeds  Cardiovascular: No chest pain  Respiratory: No shortness of breath and no cough  Gastrointestinal: No abdominal pain, no nausea, no diarrhea, and no vomiting  Musculoskeletal: No arthralgias  Integumentary: No rashes and no skin lesions  Neurological: No headache  Psychiatric: No sleep disturbances no depression  Endocrine: No muscle weakness and no muscle cramps  Hematologic/lymphatic: No swelling glands and no tendency for easy bruising    Medical History[1]     Surgical History[2]     Current Medications[3]     RX Allergies[4]     Social Connections: Not on file          Objective   60-year-old female well appearing in no acute distress. Alert and oriented ×3.  Skin intact bilateral upper extremities.   Normal tandem gait. Coordination and balance intact.  Bilateral upper extremity compartments supple.  5 out of 5 distal motor strength  bilaterally.  C2 through C7 sensation intact bilaterally.  2+ distal pulses bilaterally.  She has full and symmetric forward flexion abduction of the right shoulder when compared to the left.  Negative empty can bilaterally.  Good strength with internal and external rotation bilaterally.  Negative belly press test bilaterally.  Decreased internal rotation on the right when compared to the left.  No tenderness over the right AC joint or right proximal biceps tendon.  Positive Neer and Smith impingement sign on the right.  She has full active range of motion of the cervical spine in all directions but does report some pain with lateral rotation to the right.  Negative Spurling's bilaterally.    Image Results:  X-rays of the right shoulder taken today in the office were reviewed.  These were negative for fracture or dislocation.    L Inj/Asp: R subacromial bursa on 7/9/2025 9:55 AM  Indications: pain  Details: 22 G needle, posterior approach  Medications: 40 mg triamcinolone acetonide 40 mg/mL        Subacromial Injection:    Right subacromial bursa injection offered for therapeutic purposes. Indication: pain and subacromial bursitis. Risks and benefits were discussed. After questions were answered the patient provided consent to proceed. The patient was injected from the posterior position. Under sterile conditions, the subacromial bursa was injected with 40 mg Kenalog and 4 cc lidocaine without complication. The injection was tolerated well. Post injection instructions provided.      Assessment/Plan   Encounter Diagnoses:  Rotator cuff tendonitis, right    Acute pain of right shoulder    Orders Placed This Encounter    XR shoulder right 2+ views       We opted to proceed with a subacromial injection on the right.  The subacromial injection will be used for therapeutic and diagnostic purposes.  If her pain around the shoulder improves but she continues to have discomfort around the scapula or down towards her hand  and wrist then this may be an indicator that she is having more issues with her cervical spine.  If this is the case then we will refer her to Dr. Douglas.  She will continue with physical therapy.  She will call with any questions or concerns.    This office note was dictated using Dragon voice to text software and was not proofread for spelling or grammatical errors       [1]   Past Medical History:  Diagnosis Date    Allergic 1/1/1980    Atypical lobular hyperplasia (ALH) of right breast     Benign paroxysmal vertigo, unspecified ear 01/16/2021    BPPV (benign paroxysmal positional vertigo)    Other conditions influencing health status     Malignant Melanoma Of The Skin    Other conditions influencing health status 06/03/2014    History of dyspareunia    Other conditions influencing health status 10/01/2013    Toxicity From Rifampin    Personal history of diseases of the skin and subcutaneous tissue     History of pilonidal cyst    Personal history of other diseases of urinary system 11/16/2015    History of cystocele    Personal history of other specified conditions 03/12/2019    History of lump of right breast    Stress incontinence (female) (male) 03/18/2019    Female genuine stress incontinence    Unspecified injury of unspecified elbow, initial encounter     Elbow injury   [2]   Past Surgical History:  Procedure Laterality Date    BREAST BIOPSY      COLONOSCOPY  12/21/2012    Colonoscopy (Fiberoptic)    DILATION AND CURETTAGE OF UTERUS  12/20/2012    Dilation And Curettage    HYSTERECTOMY  09/30/2014    Hysterectomy    OTHER SURGICAL HISTORY  12/21/2012    Electrocardiogram    OTHER SURGICAL HISTORY  05/18/2020    Breast biopsy    OTHER SURGICAL HISTORY  04/02/2019    Bladder surgery    TONSILLECTOMY  12/20/2012    Tonsillectomy With Adenoidectomy    VARICOSE VEIN SURGERY  12/21/2012    Varicose Vein Ligation    WISDOM TOOTH EXTRACTION  6/7/1984   [3]   Current Outpatient Medications:     ALPRAZolam (Xanax)  0.5 mg tablet, Take 1 tablet (0.5 mg) by mouth 3 times a day as needed for anxiety for up to 6 days., Disp: 18 tablet, Rfl: 0    estradiol (Estrace) 0.01 % (0.1 mg/gram) vaginal cream, Take nightly 1g for two weeks, then use At bedtime twice a week., Disp: 42.5 g, Rfl: 3    meclizine (Antivert) 25 mg tablet, Take 1 tablet (25 mg) by mouth 3 times a day as needed for dizziness., Disp: , Rfl:     ondansetron (Zofran) 4 mg tablet, Take 1 tablet (4 mg) by mouth every 6 hours if needed for nausea for up to 20 doses., Disp: 20 tablet, Rfl: 0  [4]   Allergies  Allergen Reactions    Morphine Other     N/V

## 2025-07-24 NOTE — TELEPHONE ENCOUNTER
Patient called regarding final test results reflexed on Lyme titer.     Last patient note on file is awaiting confirmation test. Please advise on results and recommendations.     [phone] - Emiliano - frank/giovanni is okay.    Patient informed of referral   Please schedule.   Thank you

## 2025-08-05 ENCOUNTER — APPOINTMENT (OUTPATIENT)
Dept: PRIMARY CARE | Facility: CLINIC | Age: 60
End: 2025-08-05
Payer: COMMERCIAL

## 2025-08-05 VITALS
RESPIRATION RATE: 14 BRPM | HEART RATE: 72 BPM | SYSTOLIC BLOOD PRESSURE: 112 MMHG | WEIGHT: 137 LBS | BODY MASS INDEX: 23.39 KG/M2 | OXYGEN SATURATION: 98 % | TEMPERATURE: 97.9 F | HEIGHT: 64 IN | DIASTOLIC BLOOD PRESSURE: 80 MMHG

## 2025-08-05 DIAGNOSIS — D24.2 BREAST FIBROADENOMA, LEFT: ICD-10-CM

## 2025-08-05 DIAGNOSIS — Z98.890 PONV (POSTOPERATIVE NAUSEA AND VOMITING): ICD-10-CM

## 2025-08-05 DIAGNOSIS — F41.9 ANXIETY: ICD-10-CM

## 2025-08-05 DIAGNOSIS — Z00.00 WELLNESS EXAMINATION: Primary | ICD-10-CM

## 2025-08-05 DIAGNOSIS — K59.09 CHRONIC CONSTIPATION: ICD-10-CM

## 2025-08-05 DIAGNOSIS — R11.2 PONV (POSTOPERATIVE NAUSEA AND VOMITING): ICD-10-CM

## 2025-08-05 DIAGNOSIS — E78.2 MIXED HYPERLIPIDEMIA: ICD-10-CM

## 2025-08-05 DIAGNOSIS — Z12.31 ENCOUNTER FOR SCREENING MAMMOGRAM FOR MALIGNANT NEOPLASM OF BREAST: ICD-10-CM

## 2025-08-05 PROCEDURE — 3008F BODY MASS INDEX DOCD: CPT | Performed by: INTERNAL MEDICINE

## 2025-08-05 PROCEDURE — 99396 PREV VISIT EST AGE 40-64: CPT | Performed by: INTERNAL MEDICINE

## 2025-08-05 RX ORDER — ONDANSETRON 4 MG/1
4 TABLET, FILM COATED ORAL EVERY 6 HOURS PRN
Qty: 20 TABLET | Refills: 0 | Status: SHIPPED | OUTPATIENT
Start: 2025-08-05

## 2025-08-05 RX ORDER — ALPRAZOLAM 0.5 MG/1
0.5 TABLET ORAL 3 TIMES DAILY PRN
Qty: 18 TABLET | Refills: 0 | Status: SHIPPED | OUTPATIENT
Start: 2025-08-05 | End: 2025-08-11

## 2025-08-05 ASSESSMENT — PATIENT HEALTH QUESTIONNAIRE - PHQ9
SUM OF ALL RESPONSES TO PHQ9 QUESTIONS 1 AND 2: 0
1. LITTLE INTEREST OR PLEASURE IN DOING THINGS: NOT AT ALL
2. FEELING DOWN, DEPRESSED OR HOPELESS: NOT AT ALL

## 2025-08-05 NOTE — PROGRESS NOTES
"Subjective    Kaylie Doty is a 60 y.o. female who presents for Annual Exam.  HPI    Colonoscopy 10/2022 repeat in 2027  Mamm 12/2024    Shingrix 2020/2019    Tdap    Reports chronic constipation. Amitiza does not work.   She is miserable especially if she travels.    Sees derm twice a year  Discuss estrogen. She can't take oral estrogen due to breast atypia. She  has vaginal estrogen that she does not use very often.     RF Xanax and zofran     Review of Systems   All other systems reviewed and are negative.      Objective     /80 (BP Location: Left arm, Patient Position: Sitting, BP Cuff Size: Adult)   Pulse 72   Temp 36.6 °C (97.9 °F) (Skin)   Resp 14   Ht 1.626 m (5' 4\")   Wt 62.1 kg (137 lb)   SpO2 98%   BMI 23.52 kg/m²    Physical Exam  Constitutional:       Appearance: Normal appearance.   HENT:      Mouth/Throat:      Mouth: Mucous membranes are moist.   Neck:      Thyroid: No thyroid mass or thyromegaly.     Cardiovascular:      Rate and Rhythm: Normal rate and regular rhythm.      Pulses: Normal pulses.   Pulmonary:      Effort: Pulmonary effort is normal.      Breath sounds: Normal breath sounds.   Chest:      Chest wall: No mass or tenderness.   Breasts:     Right: Normal.      Left: Normal.   Abdominal:      General: Abdomen is flat.      Palpations: Abdomen is soft.      Tenderness: There is no abdominal tenderness.     Musculoskeletal:      Cervical back: Neck supple. No tenderness.   Lymphadenopathy:      Cervical: No cervical adenopathy.      Upper Body:      Right upper body: No supraclavicular or axillary adenopathy.      Left upper body: No supraclavicular or axillary adenopathy.     Skin:     General: Skin is warm.     Neurological:      General: No focal deficit present.      Mental Status: She is alert.     Psychiatric:         Attention and Perception: Attention and perception normal.         Mood and Affect: Mood and affect normal.       Health Maintenance Due   Topic Date Due    " HIV Screening  Never done    MMR Vaccines (1 of 1 - Standard series) Never done    Hepatitis B Vaccines (1 of 3 - 19+ 3-dose series) Never done    DTaP/Tdap/Td Vaccines (1 - Tdap) Never done    Pneumococcal Vaccine (1 of 1 - PCV) Never done    COVID-19 Vaccine (4 - 2024-25 season) 09/01/2024    Yearly Adult Physical  08/01/2025    Influenza Vaccine (1) 09/01/2025          Assessment/Plan   Problem List Items Addressed This Visit       Breast fibroadenoma, left    Relevant Medications    ondansetron (Zofran) 4 mg tablet     Other Visit Diagnoses         Wellness examination    -  Primary    Relevant Orders    CBC    Comprehensive Metabolic Panel    Lipid Panel      Anxiety        Relevant Medications    ALPRAZolam (Xanax) 0.5 mg tablet      PONV (postoperative nausea and vomiting)        Relevant Medications    ondansetron (Zofran) 4 mg tablet      Chronic constipation        Relevant Orders    Referral to Clinical Pharmacy      Mixed hyperlipidemia        Relevant Orders    Comprehensive Metabolic Panel    Lipid Panel      Encounter for screening mammogram for malignant neoplasm of breast        Relevant Orders    BI mammo bilateral screening tomosynthesis        Check labs.   Can use vaginal estrogen.   Refer to clinical pharmacy to see if anything can be done to help her with her Linzess  Check mamm  Call  with any problems or questions.   Follow up in a year  Recommend Mediterranean type diet with healthy protein, vegetables, and healthy fats being main source of fuel. Sugars and highly processed carbs sparingly  Recommend exercise program that includes both cardio and weight training to maintain healthy lifestyle

## 2025-08-08 ENCOUNTER — TELEMEDICINE (OUTPATIENT)
Dept: PHARMACY | Facility: HOSPITAL | Age: 60
End: 2025-08-08
Payer: COMMERCIAL

## 2025-08-08 ENCOUNTER — TELEPHONE (OUTPATIENT)
Dept: PRIMARY CARE | Facility: CLINIC | Age: 60
End: 2025-08-08

## 2025-08-08 DIAGNOSIS — K58.1 IRRITABLE BOWEL SYNDROME WITH PREDOMINANT CONSTIPATION: Primary | ICD-10-CM

## 2025-08-08 PROCEDURE — RXMED WILLOW AMBULATORY MEDICATION CHARGE

## 2025-08-08 NOTE — Clinical Note
Makayla Beebe,  I spoke with the patient and Linzess is covered under her insruance with no PA needed. Can you send a prescription over to [facility] for Linzess? The copay will be $35. I updated her pharmacy to the correct one along with provide the pharmacy info below:  [address], [address] [facility] [address]  P: [phone] F: [phone]  Thank you! Kg Centeno, PharmD

## 2025-08-08 NOTE — TELEPHONE ENCOUNTER
----- Message from Kaylie Beebe sent at 8/8/2025  2:12 PM EDT -----  Can you let her know I ordered her linzess sent her in the 145 mg dose can increase if not effective just let me know

## 2025-08-08 NOTE — ASSESSMENT & PLAN NOTE
CONTINUE all meds as prescribed  Educate on side effects  Linzess covered by insurance, no PA needed. Copay $35 with copay card. PCP to send Rx to  retail pharmacy  FUV as needed with clinical pharmacy

## 2025-08-08 NOTE — PROGRESS NOTES
"[facility] Pharmacy Consult  Kaylie Doty is a 60 y.o. female was referred to Clinical Pharmacy Team for a Pharmacy consult.  The patient was referred for their IBS.    Referring Provider: Kaylie Beebe DO    Subjective   Allergies[1]    [id number] - [place], [place] - [address] AT [facility] [address]  [address]  [facility] [address]  Phone: [phone] Fax: [phone]    [facility] - [place], [place] - [address]  [address]  [address]  [facility] [address]  Phone: [phone] Fax: [phone]    [facility]  [address]  [facility] [address]  Phone: [phone] Fax: [phone]      HPI  Patient is here with a clinical visit with the clinical pharmacy team in regards to her Linzess use for IBS. Pt reported  Linzess used previously and it works. Reported Amitiza does not work. Currently taking Mag oxide. Only been a few days on it.     No other concerns and wants to get back on Linzess.  Review of Systems    Objective     There were no vitals taken for this visit.     LAB  Lab Results   Component Value Date    BILITOT 0.4 08/30/2024    CALCIUM 9.1 08/30/2024    CO2 31 08/30/2024     08/30/2024    CREATININE 0.85 08/30/2024    GLUCOSE 81 08/30/2024    ALKPHOS 48 08/30/2024    K 4.5 08/30/2024    PROT 6.6 08/30/2024     08/30/2024    AST 15 08/30/2024    ALT 15 08/30/2024    BUN 14 08/30/2024    ANIONGAP 9 (L) 08/30/2024    MG 1.83 08/30/2024    ALBUMIN 4.0 08/30/2024    GFRF 85 07/28/2023     Lab Results   Component Value Date    TRIG 66 08/30/2024    CHOL 205 (H) 08/30/2024    LDLCALC 115 (H) 08/30/2024    HDL 76.5 08/30/2024     No results found for: \"HGBA1C\"    Medications Ordered Prior to Encounter[2]     HISTORICAL PHARMACOTHERAPY  -none    DRUG INTERACTIONS  - none    Assessment/Plan   Problem List Items Addressed This Visit       Irritable bowel syndrome with predominant " constipation - Primary    CONTINUE all meds as prescribed  Educate on side effects  Linzess covered by insurance, no PA needed. Copay $35 with copay card. PCP to send Rx to  retail pharmacy  FUV as needed with clinical pharmacy             Continue all meds under the continuation of care with the referring provider and clinical pharmacy team.    Kg Centeno PharmD     Verbal consent to manage patient's drug therapy was obtained from [the patient and/or an individual authorized to act on behalf of a patient]. They were informed they may decline to participate or withdraw from participation in pharmacy services at any time.       [1]   Allergies  Allergen Reactions    Morphine Other     N/V   [2]   Current Outpatient Medications on File Prior to Visit   Medication Sig Dispense Refill    ALPRAZolam (Xanax) 0.5 mg tablet Take 1 tablet (0.5 mg) by mouth 3 times a day as needed for anxiety for up to 6 days. 18 tablet 0    estradiol (Estrace) 0.01 % (0.1 mg/gram) vaginal cream Take nightly 1g for two weeks, then use At bedtime twice a week. 42.5 g 3    meclizine (Antivert) 25 mg tablet Take 1 tablet (25 mg) by mouth 3 times a day as needed for dizziness.      ondansetron (Zofran) 4 mg tablet Take 1 tablet (4 mg) by mouth every 6 hours if needed for nausea for up to 20 doses. 20 tablet 0    [DISCONTINUED] ALPRAZolam (Xanax) 0.5 mg tablet Take 1 tablet (0.5 mg) by mouth 3 times a day as needed for anxiety for up to 6 days. 18 tablet 0    [DISCONTINUED] ondansetron (Zofran) 4 mg tablet Take 1 tablet (4 mg) by mouth every 6 hours if needed for nausea for up to 20 doses. 20 tablet 0     No current facility-administered medications on file prior to visit.

## 2025-08-11 ENCOUNTER — PHARMACY VISIT (OUTPATIENT)
Dept: PHARMACY | Facility: CLINIC | Age: 60
End: 2025-08-11
Payer: COMMERCIAL

## 2025-08-19 LAB
ALBUMIN SERPL-MCNC: 4.4 G/DL (ref 3.6–5.1)
ALP SERPL-CCNC: 41 U/L (ref 37–153)
ALT SERPL-CCNC: 16 U/L (ref 6–29)
ANION GAP SERPL CALCULATED.4IONS-SCNC: 8 MMOL/L (CALC) (ref 7–17)
AST SERPL-CCNC: 16 U/L (ref 10–35)
BILIRUB SERPL-MCNC: 0.4 MG/DL (ref 0.2–1.2)
BUN SERPL-MCNC: 16 MG/DL (ref 7–25)
CALCIUM SERPL-MCNC: 9.7 MG/DL (ref 8.6–10.4)
CHLORIDE SERPL-SCNC: 102 MMOL/L (ref 98–110)
CHOLEST SERPL-MCNC: 246 MG/DL
CHOLEST/HDLC SERPL: 3 (CALC)
CO2 SERPL-SCNC: 30 MMOL/L (ref 20–32)
CREAT SERPL-MCNC: 0.78 MG/DL (ref 0.5–1.03)
EGFRCR SERPLBLD CKD-EPI 2021: 88 ML/MIN/1.73M2
ERYTHROCYTE [DISTWIDTH] IN BLOOD BY AUTOMATED COUNT: 12.4 % (ref 11–15)
GLUCOSE SERPL-MCNC: 88 MG/DL (ref 65–99)
HCT VFR BLD AUTO: 38.4 % (ref 35–45)
HDLC SERPL-MCNC: 82 MG/DL
HGB BLD-MCNC: 12.4 G/DL (ref 11.7–15.5)
LDLC SERPL CALC-MCNC: 145 MG/DL (CALC)
MCH RBC QN AUTO: 30.1 PG (ref 27–33)
MCHC RBC AUTO-ENTMCNC: 32.3 G/DL (ref 32–36)
MCV RBC AUTO: 93.2 FL (ref 80–100)
NONHDLC SERPL-MCNC: 164 MG/DL (CALC)
PLATELET # BLD AUTO: 272 THOUSAND/UL (ref 140–400)
PMV BLD REES-ECKER: 9.8 FL (ref 7.5–12.5)
POTASSIUM SERPL-SCNC: 4.5 MMOL/L (ref 3.5–5.3)
PROT SERPL-MCNC: 6.6 G/DL (ref 6.1–8.1)
RBC # BLD AUTO: 4.12 MILLION/UL (ref 3.8–5.1)
SODIUM SERPL-SCNC: 140 MMOL/L (ref 135–146)
TRIGL SERPL-MCNC: 86 MG/DL
WBC # BLD AUTO: 6.1 THOUSAND/UL (ref 3.8–10.8)

## 2025-08-19 ASSESSMENT — ENCOUNTER SYMPTOMS
VOMITING: 0
SHORTNESS OF BREATH: 0
DIFFICULTY URINATING: 0
DIZZINESS: 0
FEVER: 0
AGITATION: 0
COLOR CHANGE: 0
DIARRHEA: 0
NAUSEA: 0
DYSURIA: 0

## 2025-08-20 ENCOUNTER — APPOINTMENT (OUTPATIENT)
Dept: INTEGRATIVE MEDICINE | Facility: CLINIC | Age: 60
End: 2025-08-20
Payer: COMMERCIAL

## 2025-08-20 DIAGNOSIS — M99.03 SOMATIC DYSFUNCTION OF LUMBAR REGION: ICD-10-CM

## 2025-08-20 DIAGNOSIS — M99.02 SOMATIC DYSFUNCTION OF THORACIC REGION: Primary | ICD-10-CM

## 2025-08-20 DIAGNOSIS — M99.05 SOMATIC DYSFUNCTION OF PELVIS REGION: ICD-10-CM

## 2025-08-20 DIAGNOSIS — M25.559 GREATER TROCHANTERIC PAIN SYNDROME: ICD-10-CM

## 2025-08-27 DIAGNOSIS — F51.01 PRIMARY INSOMNIA: Primary | ICD-10-CM

## 2025-08-27 RX ORDER — TRAZODONE HYDROCHLORIDE 50 MG/1
50 TABLET ORAL NIGHTLY PRN
Qty: 30 TABLET | Refills: 5 | Status: SHIPPED | OUTPATIENT
Start: 2025-08-27 | End: 2026-08-27

## 2025-10-08 ENCOUNTER — APPOINTMENT (OUTPATIENT)
Dept: ORTHOPEDIC SURGERY | Facility: CLINIC | Age: 60
End: 2025-10-08
Payer: COMMERCIAL

## 2025-10-22 ENCOUNTER — APPOINTMENT (OUTPATIENT)
Dept: OBSTETRICS AND GYNECOLOGY | Facility: CLINIC | Age: 60
End: 2025-10-22
Payer: COMMERCIAL

## 2026-08-12 ENCOUNTER — APPOINTMENT (OUTPATIENT)
Dept: PRIMARY CARE | Facility: CLINIC | Age: 61
End: 2026-08-12
Payer: COMMERCIAL

## (undated) DEVICE — SUTURE, VICRYL, 2-0, 27 IN, SH, UNDYED

## (undated) DEVICE — SUTURE, VICRYL, 3-0, 27 IN, SH

## (undated) DEVICE — SOLUTION, IRRIGATION, STERILE WATER, 1000 ML, POUR BOTTLE

## (undated) DEVICE — PROBE COVER, INTRAOPERATIVE, 13 X 244CM (5 X 96IN)

## (undated) DEVICE — Device

## (undated) DEVICE — SUTURE, MONOCRYL, 4-0, 27 IN, PS-2, UNDYED

## (undated) DEVICE — DRESSING, TRANSPARENT, TEGADERM, 4 X 4-3/4 IN

## (undated) DEVICE — COVER, MAYO STAND, W/PAD, 23 IN, DISPOSABLE, PLASTIC, LF, STERILE

## (undated) DEVICE — SUPPORT, MAMMARY, THERAPEUTIC, SURGI-BRA, LARGE, LF

## (undated) DEVICE — GLOVE, SURGICAL, PROTEXIS PI , 5.5, PF, LF

## (undated) DEVICE — SOLUTION, IRRIGATION, SODIUM CHLORIDE 0.9%, 1000 ML, POUR BOTTLE

## (undated) DEVICE — STRIP, SKIN CLOSURE, STERI STRIP, REINFORCED, 0.5 X 4 IN

## (undated) DEVICE — SUTURE, STRATAFIX, SPIRAL MONOCRYL PLUS, 3-0, PS-1 70CM, UNDYED

## (undated) DEVICE — MARKER, SKIN, DUAL TIP, W/RULER AND LABEL

## (undated) DEVICE — APPLICATOR, CHLORAPREP, W/ORANGE TINT, 26ML

## (undated) DEVICE — TOWEL PACK, STERILE, 4/PACK, BLUE